# Patient Record
Sex: FEMALE | Race: BLACK OR AFRICAN AMERICAN | NOT HISPANIC OR LATINO
[De-identification: names, ages, dates, MRNs, and addresses within clinical notes are randomized per-mention and may not be internally consistent; named-entity substitution may affect disease eponyms.]

---

## 2017-01-03 ENCOUNTER — APPOINTMENT (OUTPATIENT)
Dept: OBGYN | Facility: CLINIC | Age: 57
End: 2017-01-03

## 2017-01-03 VITALS
HEIGHT: 67 IN | WEIGHT: 192 LBS | DIASTOLIC BLOOD PRESSURE: 80 MMHG | BODY MASS INDEX: 30.13 KG/M2 | SYSTOLIC BLOOD PRESSURE: 110 MMHG

## 2017-01-03 LAB
BILIRUB UR QL STRIP: NORMAL
GLUCOSE UR-MCNC: NORMAL
HCG UR QL: 1 EU/DL
HGB UR QL STRIP.AUTO: NORMAL
KETONES UR-MCNC: NORMAL
LEUKOCYTE ESTERASE UR QL STRIP: NORMAL
NITRITE UR QL STRIP: NORMAL
PH UR STRIP: 6
PROT UR STRIP-MCNC: NORMAL
SP GR UR STRIP: 1.02

## 2017-01-05 LAB — BACTERIA UR CULT: NORMAL

## 2017-03-17 ENCOUNTER — NON-APPOINTMENT (OUTPATIENT)
Age: 57
End: 2017-03-17

## 2017-03-17 ENCOUNTER — APPOINTMENT (OUTPATIENT)
Dept: INTERNAL MEDICINE | Facility: CLINIC | Age: 57
End: 2017-03-17

## 2017-03-17 VITALS
OXYGEN SATURATION: 98 % | DIASTOLIC BLOOD PRESSURE: 77 MMHG | HEART RATE: 80 BPM | RESPIRATION RATE: 18 BRPM | BODY MASS INDEX: 32.96 KG/M2 | HEIGHT: 67 IN | TEMPERATURE: 98.9 F | WEIGHT: 210 LBS | SYSTOLIC BLOOD PRESSURE: 114 MMHG

## 2017-03-17 RX ORDER — PHENAZOPYRIDINE 200 MG/1
200 TABLET, FILM COATED ORAL 3 TIMES DAILY
Qty: 6 | Refills: 0 | Status: DISCONTINUED | COMMUNITY
Start: 2017-01-03 | End: 2017-03-17

## 2017-03-17 RX ORDER — CIPROFLOXACIN HYDROCHLORIDE 500 MG/1
500 TABLET, FILM COATED ORAL
Qty: 6 | Refills: 0 | Status: DISCONTINUED | COMMUNITY
Start: 2017-01-03 | End: 2017-03-17

## 2017-03-28 LAB
25(OH)D3 SERPL-MCNC: 23.1 NG/ML
ALBUMIN SERPL ELPH-MCNC: 3.9 G/DL
ALP BLD-CCNC: 70 U/L
ALT SERPL-CCNC: 22 U/L
ANION GAP SERPL CALC-SCNC: 12 MMOL/L
AST SERPL-CCNC: 17 U/L
BASOPHILS # BLD AUTO: 0.02 K/UL
BASOPHILS NFR BLD AUTO: 0.4 %
BILIRUB SERPL-MCNC: 0.9 MG/DL
BUN SERPL-MCNC: 12 MG/DL
CALCIUM SERPL-MCNC: 9.7 MG/DL
CHLORIDE SERPL-SCNC: 106 MMOL/L
CHOLEST SERPL-MCNC: 156 MG/DL
CHOLEST/HDLC SERPL: 2.8 RATIO
CO2 SERPL-SCNC: 24 MMOL/L
CREAT SERPL-MCNC: 0.78 MG/DL
EOSINOPHIL # BLD AUTO: 0.17 K/UL
EOSINOPHIL NFR BLD AUTO: 3 %
GLUCOSE SERPL-MCNC: 88 MG/DL
HCT VFR BLD CALC: 41.2 %
HDLC SERPL-MCNC: 56 MG/DL
HGB BLD-MCNC: 13.9 G/DL
HIV1+2 AB SPEC QL IA.RAPID: NONREACTIVE
IMM GRANULOCYTES NFR BLD AUTO: 0.2 %
LDLC SERPL CALC-MCNC: 89 MG/DL
LYMPHOCYTES # BLD AUTO: 2.64 K/UL
LYMPHOCYTES NFR BLD AUTO: 47.2 %
MAN DIFF?: NORMAL
MCHC RBC-ENTMCNC: 30.2 PG
MCHC RBC-ENTMCNC: 33.7 GM/DL
MCV RBC AUTO: 89.6 FL
MONOCYTES # BLD AUTO: 0.65 K/UL
MONOCYTES NFR BLD AUTO: 11.6 %
NEUTROPHILS # BLD AUTO: 2.1 K/UL
NEUTROPHILS NFR BLD AUTO: 37.6 %
PLATELET # BLD AUTO: 261 K/UL
POTASSIUM SERPL-SCNC: 4.2 MMOL/L
PROT SERPL-MCNC: 6.5 G/DL
RBC # BLD: 4.6 M/UL
RBC # FLD: 13.4 %
SODIUM SERPL-SCNC: 142 MMOL/L
T4 FREE SERPL-MCNC: 1.2 NG/DL
TRIGL SERPL-MCNC: 54 MG/DL
TSH SERPL-ACNC: 1.12 UIU/ML
WBC # FLD AUTO: 5.59 K/UL

## 2017-05-19 ENCOUNTER — APPOINTMENT (OUTPATIENT)
Dept: OBGYN | Facility: CLINIC | Age: 57
End: 2017-05-19

## 2017-05-19 VITALS
HEIGHT: 67 IN | BODY MASS INDEX: 32.96 KG/M2 | DIASTOLIC BLOOD PRESSURE: 70 MMHG | SYSTOLIC BLOOD PRESSURE: 126 MMHG | WEIGHT: 210 LBS

## 2017-05-19 DIAGNOSIS — R39.9 UNSPECIFIED SYMPTOMS AND SIGNS INVOLVING THE GENITOURINARY SYSTEM: ICD-10-CM

## 2017-05-22 ENCOUNTER — NON-APPOINTMENT (OUTPATIENT)
Age: 57
End: 2017-05-22

## 2017-05-22 ENCOUNTER — APPOINTMENT (OUTPATIENT)
Dept: GASTROENTEROLOGY | Facility: CLINIC | Age: 57
End: 2017-05-22

## 2017-05-22 ENCOUNTER — APPOINTMENT (OUTPATIENT)
Dept: CARDIOLOGY | Facility: CLINIC | Age: 57
End: 2017-05-22

## 2017-05-22 VITALS
BODY MASS INDEX: 32.96 KG/M2 | HEIGHT: 67 IN | HEART RATE: 69 BPM | DIASTOLIC BLOOD PRESSURE: 85 MMHG | OXYGEN SATURATION: 97 % | WEIGHT: 210 LBS | SYSTOLIC BLOOD PRESSURE: 133 MMHG

## 2017-05-22 VITALS
BODY MASS INDEX: 32.96 KG/M2 | OXYGEN SATURATION: 98 % | DIASTOLIC BLOOD PRESSURE: 76 MMHG | WEIGHT: 210 LBS | HEIGHT: 67 IN | HEART RATE: 76 BPM | SYSTOLIC BLOOD PRESSURE: 150 MMHG

## 2017-05-22 PROBLEM — R39.9 UTI SYMPTOMS: Status: RESOLVED | Noted: 2017-01-03 | Resolved: 2017-05-22

## 2017-05-22 LAB — HPV HIGH+LOW RISK DNA PNL CVX: NEGATIVE

## 2017-05-23 ENCOUNTER — APPOINTMENT (OUTPATIENT)
Dept: CARDIOLOGY | Facility: CLINIC | Age: 57
End: 2017-05-23

## 2017-05-24 ENCOUNTER — APPOINTMENT (OUTPATIENT)
Dept: CARDIOLOGY | Facility: CLINIC | Age: 57
End: 2017-05-24

## 2017-05-24 LAB — CYTOLOGY CVX/VAG DOC THIN PREP: NORMAL

## 2017-05-26 ENCOUNTER — APPOINTMENT (OUTPATIENT)
Dept: GASTROENTEROLOGY | Facility: CLINIC | Age: 57
End: 2017-05-26

## 2017-05-26 ENCOUNTER — TRANSCRIPTION ENCOUNTER (OUTPATIENT)
Age: 57
End: 2017-05-26

## 2017-05-26 ENCOUNTER — RESULT REVIEW (OUTPATIENT)
Age: 57
End: 2017-05-26

## 2017-05-26 ENCOUNTER — OUTPATIENT (OUTPATIENT)
Dept: OUTPATIENT SERVICES | Facility: HOSPITAL | Age: 57
LOS: 1 days | Discharge: HOME | End: 2017-05-26
Payer: COMMERCIAL

## 2017-05-26 DIAGNOSIS — R07.89 OTHER CHEST PAIN: ICD-10-CM

## 2017-05-26 DIAGNOSIS — Z12.11 ENCOUNTER FOR SCREENING FOR MALIGNANT NEOPLASM OF COLON: ICD-10-CM

## 2017-05-26 DIAGNOSIS — K59.00 CONSTIPATION, UNSPECIFIED: ICD-10-CM

## 2017-05-26 PROCEDURE — 88313 SPECIAL STAINS GROUP 2: CPT

## 2017-05-26 PROCEDURE — 43239 EGD BIOPSY SINGLE/MULTIPLE: CPT

## 2017-05-26 PROCEDURE — 88313 SPECIAL STAINS GROUP 2: CPT | Mod: 26

## 2017-05-26 PROCEDURE — 45380 COLONOSCOPY AND BIOPSY: CPT | Mod: 59

## 2017-05-26 PROCEDURE — 88312 SPECIAL STAINS GROUP 1: CPT | Mod: 26

## 2017-05-26 PROCEDURE — 88312 SPECIAL STAINS GROUP 1: CPT

## 2017-05-26 PROCEDURE — 45385 COLONOSCOPY W/LESION REMOVAL: CPT

## 2017-05-26 PROCEDURE — 88305 TISSUE EXAM BY PATHOLOGIST: CPT

## 2017-05-26 PROCEDURE — 45380 COLONOSCOPY AND BIOPSY: CPT | Mod: XS

## 2017-05-26 PROCEDURE — 88305 TISSUE EXAM BY PATHOLOGIST: CPT | Mod: 26

## 2017-05-30 DIAGNOSIS — K44.9 DIAPHRAGMATIC HERNIA WITHOUT OBSTRUCTION OR GANGRENE: ICD-10-CM

## 2017-05-30 DIAGNOSIS — D12.0 BENIGN NEOPLASM OF CECUM: ICD-10-CM

## 2017-05-30 DIAGNOSIS — K59.00 CONSTIPATION, UNSPECIFIED: ICD-10-CM

## 2017-05-30 DIAGNOSIS — D12.5 BENIGN NEOPLASM OF SIGMOID COLON: ICD-10-CM

## 2017-05-30 DIAGNOSIS — Z88.6 ALLERGY STATUS TO ANALGESIC AGENT: ICD-10-CM

## 2017-05-30 DIAGNOSIS — E55.9 VITAMIN D DEFICIENCY, UNSPECIFIED: ICD-10-CM

## 2017-05-30 DIAGNOSIS — K21.9 GASTRO-ESOPHAGEAL REFLUX DISEASE WITHOUT ESOPHAGITIS: ICD-10-CM

## 2017-05-30 DIAGNOSIS — Z88.0 ALLERGY STATUS TO PENICILLIN: ICD-10-CM

## 2017-05-30 DIAGNOSIS — K64.8 OTHER HEMORRHOIDS: ICD-10-CM

## 2017-06-01 DIAGNOSIS — Z88.6 ALLERGY STATUS TO ANALGESIC AGENT: ICD-10-CM

## 2017-06-09 ENCOUNTER — APPOINTMENT (OUTPATIENT)
Dept: GASTROENTEROLOGY | Facility: CLINIC | Age: 57
End: 2017-06-09

## 2017-06-15 ENCOUNTER — MESSAGE (OUTPATIENT)
Age: 57
End: 2017-06-15

## 2017-06-19 ENCOUNTER — APPOINTMENT (OUTPATIENT)
Dept: RADIOLOGY | Facility: CLINIC | Age: 57
End: 2017-06-19

## 2017-08-07 ENCOUNTER — APPOINTMENT (OUTPATIENT)
Dept: GASTROENTEROLOGY | Facility: CLINIC | Age: 57
End: 2017-08-07

## 2019-02-04 ENCOUNTER — APPOINTMENT (OUTPATIENT)
Dept: SPINE | Facility: CLINIC | Age: 59
End: 2019-02-04
Payer: COMMERCIAL

## 2019-02-04 VITALS
WEIGHT: 204 LBS | DIASTOLIC BLOOD PRESSURE: 80 MMHG | BODY MASS INDEX: 31.64 KG/M2 | SYSTOLIC BLOOD PRESSURE: 130 MMHG | HEIGHT: 67.5 IN

## 2019-02-04 PROCEDURE — 99204 OFFICE O/P NEW MOD 45 MIN: CPT

## 2019-02-04 RX ORDER — IBUPROFEN 600 MG/1
600 TABLET, FILM COATED ORAL
Refills: 0 | Status: DISCONTINUED | COMMUNITY
End: 2019-02-04

## 2019-02-04 RX ORDER — OMEPRAZOLE 40 MG/1
40 CAPSULE, DELAYED RELEASE ORAL
Qty: 30 | Refills: 3 | Status: DISCONTINUED | COMMUNITY
Start: 2017-06-10 | End: 2019-02-04

## 2019-02-04 RX ORDER — SODIUM SULFATE, POTASSIUM SULFATE, MAGNESIUM SULFATE 17.5; 3.13; 1.6 G/ML; G/ML; G/ML
17.5-3.13-1.6 SOLUTION, CONCENTRATE ORAL
Qty: 1 | Refills: 0 | Status: DISCONTINUED | COMMUNITY
Start: 2017-05-23 | End: 2019-02-04

## 2019-02-04 RX ORDER — OMEPRAZOLE 40 MG/1
40 CAPSULE, DELAYED RELEASE ORAL
Qty: 30 | Refills: 2 | Status: DISCONTINUED | COMMUNITY
Start: 2017-06-09 | End: 2019-02-04

## 2019-02-04 NOTE — REVIEW OF SYSTEMS
[Negative] : Heme/Lymph [Abnormal Sensation] : an abnormal sensation [As Noted in HPI] : as noted in HPI [Arthralgias] : arthralgias [de-identified] : back pain into right sided of abdomen T7

## 2019-02-04 NOTE — PLAN
[FreeTextEntry1] : As the patient has radiographic evidence of spinal cord displacement, I believe that the patient may benefit from T4-T7 laminectomies for biopsy and resection of her dorsal spinal lesion. The risks, benefits, and alternatives to surgery were discussed with the patient, who expressed understanding, but remains undecided regarding surgery. The patient will contact my office if and when she decides to proceed with surgery.

## 2019-02-04 NOTE — DATA REVIEWED
[de-identified] : CT scan from [facility].  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

## 2019-02-04 NOTE — PHYSICAL EXAM
[General Appearance - Alert] : alert [General Appearance - In No Acute Distress] : in no acute distress [Oriented To Time, Place, And Person] : oriented to person, place, and time [Impaired Insight] : insight and judgment were intact [Affect] : the affect was normal [Person] : oriented to person [Place] : oriented to place [Time] : oriented to time [Short Term Intact] : short term memory intact [Remote Intact] : remote memory intact [Span Intact] : the attention span was normal [Concentration Intact] : normal concentrating ability [Fluency] : fluency intact [Comprehension] : comprehension intact [Current Events] : adequate knowledge of current events [Past History] : adequate knowledge of personal past history [Vocabulary] : adequate range of vocabulary [Cranial Nerves Optic (II)] : visual acuity intact bilaterally,  pupils equal round and reactive to light [Cranial Nerves Oculomotor (III)] : extraocular motion intact [Cranial Nerves Trigeminal (V)] : facial sensation intact symmetrically [Cranial Nerves Facial (VII)] : face symmetrical [Cranial Nerves Vestibulocochlear (VIII)] : hearing was intact bilaterally [Cranial Nerves Glossopharyngeal (IX)] : tongue and palate midline [Cranial Nerves Accessory (XI - Cranial And Spinal)] : head turning and shoulder shrug symmetric [Cranial Nerves Hypoglossal (XII)] : there was no tongue deviation with protrusion [Motor Strength] : muscle strength was normal in all four extremities [No Muscle Atrophy] : normal bulk in all four extremities [Sensation Tactile Decrease] : light touch was intact [Balance] : balance was intact [Past-pointing] : there was no past-pointing [Tremor] : no tremor present [2+] : Patella left 2+ [L'Hermitte's] : neck flexion did not produce tingling down the spine/arms [Spurling's - Opposite Side] : Negative Spurling's on opposite side [Spurling's Same Side] : Negative Spurling's on same side [No Visual Abnormalities] : no visible abnormailities [No Tenderness to Palpation] : no spine tenderness on palpation [Full ROM] : full ROM [No Pain with ROM] : no pain with motion in any direction [Straight-Leg Raise Test - Left] : straight leg raise of the left leg was negative [Straight-Leg Raise Test - Right] : straight leg raise  of the right leg was negative [Normal] : normal [Intact] : all reflexes within normal limits bilaterally [Sclera] : the sclera and conjunctiva were normal [PERRL With Normal Accommodation] : pupils were equal in size, round, reactive to light, with normal accommodation [Extraocular Movements] : extraocular movements were intact [Outer Ear] : the ears and nose were normal in appearance [Oropharynx] : the oropharynx was normal [Neck Appearance] : the appearance of the neck was normal [Neck Cervical Mass (___cm)] : no neck mass was observed [Jugular Venous Distention Increased] : there was no jugular-venous distention [Thyroid Diffuse Enlargement] : the thyroid was not enlarged [Thyroid Nodule] : there were no palpable thyroid nodules [Auscultation Breath Sounds / Voice Sounds] : lungs were clear to auscultation bilaterally [Heart Rate And Rhythm] : heart rate was normal and rhythm regular [Heart Sounds] : normal S1 and S2 [Heart Sounds Gallop] : no gallops [Murmurs] : no murmurs [Heart Sounds Pericardial Friction Rub] : no pericardial rub [Full Pulse] : the pedal pulses are present [Edema] : there was no peripheral edema [Bowel Sounds] : normal bowel sounds [Abdomen Soft] : soft [Abdomen Tenderness] : non-tender [Abdomen Mass (___ Cm)] : no abdominal mass palpated [No CVA Tenderness] : no ~M costovertebral angle tenderness [No Spinal Tenderness] : no spinal tenderness [Abnormal Walk] : normal gait [Nail Clubbing] : no clubbing  or cyanosis of the fingernails [Musculoskeletal - Swelling] : no joint swelling seen [Motor Tone] : muscle strength and tone were normal [Skin Color & Pigmentation] : normal skin color and pigmentation [Skin Turgor] : normal skin turgor [] : no rash

## 2019-02-04 NOTE — HISTORY OF PRESENT ILLNESS
[> 3 months] : more  than 3 months [de-identified] : I had the pleasure of seeing Ms. Nasrin Esparza for an initial visit to my office today. Ms. Esparza is a pleasant 58-year-old female who unfortunately has been suffering from upper back pain associated with a right T7 radiculopathy since last September. The patient denies any numbness or tingling, upper or lower extremity symptoms, difficulty walking, or bowel and bladder issues. The patient has tried physical therapy, with minimal to no relief.

## 2019-02-04 NOTE — REASON FOR VISIT
[New Patient Visit] : a new patient visit [Referred By: _________] : Patient was referred by VIANEY [Other: _____] : [unfilled] [FreeTextEntry1] : Arachnoid cyst - thoracic spine

## 2020-08-04 ENCOUNTER — APPOINTMENT (OUTPATIENT)
Dept: INTERNAL MEDICINE | Facility: CLINIC | Age: 60
End: 2020-08-04

## 2020-08-10 ENCOUNTER — APPOINTMENT (OUTPATIENT)
Dept: INTERNAL MEDICINE | Facility: CLINIC | Age: 60
End: 2020-08-10
Payer: MEDICAID

## 2020-08-10 ENCOUNTER — NON-APPOINTMENT (OUTPATIENT)
Age: 60
End: 2020-08-10

## 2020-08-10 VITALS
DIASTOLIC BLOOD PRESSURE: 80 MMHG | WEIGHT: 199 LBS | SYSTOLIC BLOOD PRESSURE: 130 MMHG | TEMPERATURE: 97.3 F | HEIGHT: 67.5 IN | HEART RATE: 89 BPM | BODY MASS INDEX: 30.87 KG/M2 | RESPIRATION RATE: 17 BRPM | OXYGEN SATURATION: 97 %

## 2020-08-10 DIAGNOSIS — Z87.19 PERSONAL HISTORY OF OTHER DISEASES OF THE DIGESTIVE SYSTEM: ICD-10-CM

## 2020-08-10 DIAGNOSIS — Z87.2 PERSONAL HISTORY OF DISEASES OF THE SKIN AND SUBCUTANEOUS TISSUE: ICD-10-CM

## 2020-08-10 DIAGNOSIS — Z13.820 ENCOUNTER FOR SCREENING FOR OSTEOPOROSIS: ICD-10-CM

## 2020-08-10 DIAGNOSIS — Z12.11 ENCOUNTER FOR SCREENING FOR MALIGNANT NEOPLASM OF COLON: ICD-10-CM

## 2020-08-10 DIAGNOSIS — K63.5 POLYP OF COLON: ICD-10-CM

## 2020-08-10 DIAGNOSIS — K44.9 DIAPHRAGMATIC HERNIA W/OUT OBSTRUCTION OR GANGRENE: ICD-10-CM

## 2020-08-10 DIAGNOSIS — Z87.898 PERSONAL HISTORY OF OTHER SPECIFIED CONDITIONS: ICD-10-CM

## 2020-08-10 PROCEDURE — G0444 DEPRESSION SCREEN ANNUAL: CPT

## 2020-08-10 PROCEDURE — 93000 ELECTROCARDIOGRAM COMPLETE: CPT

## 2020-08-10 PROCEDURE — G0447 BEHAVIOR COUNSEL OBESITY 15M: CPT

## 2020-08-10 PROCEDURE — 99386 PREV VISIT NEW AGE 40-64: CPT | Mod: 25

## 2020-08-13 DIAGNOSIS — D72.820 LYMPHOCYTOSIS (SYMPTOMATIC): ICD-10-CM

## 2020-08-13 LAB
25(OH)D3 SERPL-MCNC: 32.6 NG/ML
ALBUMIN SERPL ELPH-MCNC: 4.2 G/DL
ALP BLD-CCNC: 84 U/L
ALT SERPL-CCNC: 17 U/L
ANION GAP SERPL CALC-SCNC: 10 MMOL/L
AST SERPL-CCNC: 12 U/L
BASOPHILS # BLD AUTO: 0.04 K/UL
BASOPHILS NFR BLD AUTO: 0.5 %
BILIRUB SERPL-MCNC: 0.6 MG/DL
BUN SERPL-MCNC: 16 MG/DL
CALCIUM SERPL-MCNC: 9.7 MG/DL
CHLORIDE SERPL-SCNC: 107 MMOL/L
CHOLEST SERPL-MCNC: 180 MG/DL
CHOLEST/HDLC SERPL: 3.3 RATIO
CO2 SERPL-SCNC: 26 MMOL/L
CREAT SERPL-MCNC: 0.89 MG/DL
EOSINOPHIL # BLD AUTO: 0.14 K/UL
EOSINOPHIL NFR BLD AUTO: 1.9 %
ESTIMATED AVERAGE GLUCOSE: 108 MG/DL
GLUCOSE SERPL-MCNC: 92 MG/DL
HBA1C MFR BLD HPLC: 5.4 %
HCT VFR BLD CALC: 43.5 %
HDLC SERPL-MCNC: 54 MG/DL
HGB BLD-MCNC: 13.7 G/DL
IMM GRANULOCYTES NFR BLD AUTO: 0.1 %
LDLC SERPL CALC-MCNC: 114 MG/DL
LYMPHOCYTES # BLD AUTO: 3.83 K/UL
LYMPHOCYTES NFR BLD AUTO: 51.1 %
MAN DIFF?: NORMAL
MCHC RBC-ENTMCNC: 29.5 PG
MCHC RBC-ENTMCNC: 31.5 GM/DL
MCV RBC AUTO: 93.5 FL
MONOCYTES # BLD AUTO: 0.84 K/UL
MONOCYTES NFR BLD AUTO: 11.2 %
NEUTROPHILS # BLD AUTO: 2.63 K/UL
NEUTROPHILS NFR BLD AUTO: 35.2 %
PLATELET # BLD AUTO: 297 K/UL
POTASSIUM SERPL-SCNC: 4.1 MMOL/L
PROT SERPL-MCNC: 7 G/DL
RBC # BLD: 4.65 M/UL
RBC # FLD: 13.6 %
SODIUM SERPL-SCNC: 143 MMOL/L
TRIGL SERPL-MCNC: 63 MG/DL
TSH SERPL-ACNC: 1.23 UIU/ML
WBC # FLD AUTO: 7.49 K/UL

## 2020-08-18 ENCOUNTER — APPOINTMENT (OUTPATIENT)
Dept: NEUROSURGERY | Facility: CLINIC | Age: 60
End: 2020-08-18
Payer: MEDICAID

## 2020-08-18 VITALS
SYSTOLIC BLOOD PRESSURE: 147 MMHG | RESPIRATION RATE: 16 BRPM | WEIGHT: 199 LBS | HEART RATE: 71 BPM | DIASTOLIC BLOOD PRESSURE: 71 MMHG | TEMPERATURE: 98.2 F | HEIGHT: 67 IN | OXYGEN SATURATION: 98 % | BODY MASS INDEX: 31.23 KG/M2

## 2020-08-18 DIAGNOSIS — Z63.5 DISRUPTION OF FAMILY BY SEPARATION AND DIVORCE: ICD-10-CM

## 2020-08-18 PROCEDURE — 99215 OFFICE O/P EST HI 40 MIN: CPT

## 2020-08-18 SDOH — SOCIAL STABILITY - SOCIAL INSECURITY: DISRUPTION OF FAMILY BY SEPARATION AND DIVORCE: Z63.5

## 2020-08-18 NOTE — PHYSICAL EXAM
[General Appearance - Alert] : alert [General Appearance - In No Acute Distress] : in no acute distress [General Appearance - Well Nourished] : well nourished [General Appearance - Well-Appearing] : healthy appearing [] : normal voice and communication [Oriented To Time, Place, And Person] : oriented to person, place, and time [Impaired Insight] : insight and judgment were intact [Affect] : the affect was normal [Mood] : the mood was normal [Memory Recent] : recent memory was not impaired [Person] : oriented to person [Place] : oriented to place [Time] : oriented to time [Cranial Nerves Optic (II)] : visual acuity intact bilaterally,  pupils equal round and reactive to light [Cranial Nerves Oculomotor (III)] : extraocular motion intact [Cranial Nerves Trigeminal (V)] : facial sensation intact symmetrically [Cranial Nerves Facial (VII)] : face symmetrical [Cranial Nerves Vestibulocochlear (VIII)] : hearing was intact bilaterally [Cranial Nerves Accessory (XI - Cranial And Spinal)] : head turning and shoulder shrug symmetric [Cranial Nerves Glossopharyngeal (IX)] : tongue and palate midline [Cranial Nerves Hypoglossal (XII)] : there was no tongue deviation with protrusion [Motor Tone] : muscle tone was normal in all four extremities [Motor Strength] : muscle strength was normal in all four extremities [Involuntary Movements] : no involuntary movements were seen [Motor Handedness Right-Handed] : the patient is right hand dominant [5] : C8 finger flexors 5/5 [Sensation Tactile Decrease] : light touch was intact [Abnormal Walk] : normal gait [Balance] : balance was intact [1+] : Brachioradialis left 1+ [2+] : Ankle jerk left 2+ [Sclera] : the sclera and conjunctiva were normal [PERRL With Normal Accommodation] : pupils were equal in size, round, reactive to light, with normal accommodation [Extraocular Movements] : extraocular movements were intact [Sensation Pain / Temperature Decrease] : pain and temperature was intact [Limited Balance] : balance was intact [Straight-Leg Raise Test - Left] : straight leg raise of the left leg was negative [Able to toe walk] : the patient was able to toe walk [Straight-Leg Raise Test - Right] : straight leg raise  of the right leg was negative [Able to heel walk] : the patient was able to heel walk

## 2020-08-18 NOTE — ASSESSMENT
[FreeTextEntry1] : IMPRESSION:\par Neck pain with radiation into the right upper extremity\par Thoracic pain with radiation around the rib cage - no improvement with drainage of upper thoracic arachnoid cyst at [place]\par Low back pain with radiation into the right lower extremity\par \par \par PLAN:\par 1. MRI w/wo contrast of cervical and thoracic spine.\par 2. MRI lumbar spine w/o.\par 3. Pt to follow up after imaging.\par \par Christian Lock MD, FACS, FAANS\par Professor and \par Department of Neurosurgery\par [facility] [employer] [facility]\par [address], [address]\[place] [address]\par [phone] Clinical\par [phone] Academic\par \par

## 2020-08-18 NOTE — HISTORY OF PRESENT ILLNESS
[de-identified] : Nasrin Esparza is a pleasant right handed 59 year old lady who presents for consult regarding multiple spinal pains.  She has a history of chronic upper back pain that was thought to be secondary to an arachnoid cyst of the upper thoracic Spine. She was seen by Dr. Yusuf on 2/4/2019, although she has no recollection of seeing him.  She had thoracic spine surgery on 3/28/2019 at [facility] by Dr. Jake Rossi, .  However, she says that her pain did not get better. She states that she has been unable to get a follow up visit with Dr. Rossi.  \par \par She presents today with c/o posterior neck pain that radiates down the right shoulder to all fingertips.  She also reports pain at the thoracic spine incision which increases when she applies pressure to the area.  She also reports right upper back pain that radiates to below right breast.  She also reports lower back cramping pain that radiates to posterior right thigh to right calf area. Pain started after bending then standing from putting a pack of H2O under a cart while working at [employer] in 2015. Pain is 8-9/10 relieved with Advil gel prn.  States that she followed up with Dr. Thania Eagle PCP at that time.  She was told by her neurologist at the time that she had  lumbar spinal stenosis by Dr. Alonzo Blancas (Othopedic Surgeon) ,[address] [place] [address], [address] [phone].   She has not had any lumbar surgery and was prescribed PT.   She also reports occasional sensation of weakness in right leg after walking long distances.  States that after walking or sitting for half an hour the pain in lower back increases.   Pain is relieved with increased activity.  She denies numbness and tingling in the lower extremities.\par \par She goes to PT twice per week and was prescribed Tramadol, Gabapentin and Meloxicam without relief. However the medications made her drowsy.\par \par Her PCP is Dr. Connie Perry, [facility].

## 2020-08-18 NOTE — DATA REVIEWED
[de-identified] : MRI THORACIC SPINE NON CONTRAST 12/11/18, 2/21/20 AND MRI THORACIC SPINE W/WO 5/30/19 DONE AT  . Preop MRI shows a subtle arachnoid cysts dorsally in the upper thoracic region with some mass effect on the spinal cord. Post op MRIs show that the cyst is no longer there.

## 2020-08-18 NOTE — CONSULT LETTER
[Please see my note below.] : Please see my note below. [Dear  ___] : Dear ~MIGUELINA, [Consult Letter:] : I had the pleasure of evaluating your patient, [unfilled]. [Sincerely,] : Sincerely, [FreeTextEntry2] : Connie Perry, \par [address] [address] [Consult Closing:] : Thank you very much for allowing me to participate in the care of this patient.  If you have any questions, please do not hesitate to contact me. [FreeTextEntry3] : Christian Lock MD, FACS, FAANS\par Professor and \par Department of Neurosurgery\par [facility]\par [address] [place] [address], [address] [place] [address]\[place] [address]\par [phone] Clinical\par [phone] Academic\par

## 2020-08-18 NOTE — SOCIAL HISTORY
[No] : No [FreeTextEntry1] : 3/16/20 became unemployed due to Covid 19 pandemic.  over 15 years ago. Had 5 children and some grandchildren.

## 2020-08-18 NOTE — REASON FOR VISIT
[New Patient Visit] : a new patient visit [Referred By: _________] : Patient was referred by VIANEY [Other: _____] : [unfilled]

## 2020-08-23 DIAGNOSIS — R79.82 ELEVATED C-REACTIVE PROTEIN (CRP): ICD-10-CM

## 2020-08-23 LAB
BASOPHILS # BLD AUTO: 0.04 K/UL
BASOPHILS NFR BLD AUTO: 0.5 %
CRP SERPL-MCNC: 0.76 MG/DL
EOSINOPHIL # BLD AUTO: 0.12 K/UL
EOSINOPHIL NFR BLD AUTO: 1.6 %
ERYTHROCYTE [SEDIMENTATION RATE] IN BLOOD BY WESTERGREN METHOD: 52 MM/HR
HCT VFR BLD CALC: 41 %
HGB BLD-MCNC: 13.8 G/DL
IMM GRANULOCYTES NFR BLD AUTO: 0.1 %
LYMPHOCYTES # BLD AUTO: 2.84 K/UL
LYMPHOCYTES NFR BLD AUTO: 38.6 %
MAN DIFF?: NORMAL
MCHC RBC-ENTMCNC: 30.7 PG
MCHC RBC-ENTMCNC: 33.7 GM/DL
MCV RBC AUTO: 91.1 FL
MONOCYTES # BLD AUTO: 0.79 K/UL
MONOCYTES NFR BLD AUTO: 10.7 %
NEUTROPHILS # BLD AUTO: 3.56 K/UL
NEUTROPHILS NFR BLD AUTO: 48.5 %
PLATELET # BLD AUTO: 321 K/UL
RBC # BLD: 4.5 M/UL
RBC # FLD: 13.4 %
WBC # FLD AUTO: 7.36 K/UL

## 2020-08-28 ENCOUNTER — RESULT REVIEW (OUTPATIENT)
Age: 60
End: 2020-08-28

## 2020-08-28 ENCOUNTER — APPOINTMENT (OUTPATIENT)
Dept: MAMMOGRAPHY | Facility: CLINIC | Age: 60
End: 2020-08-28
Payer: MEDICAID

## 2020-08-28 ENCOUNTER — OUTPATIENT (OUTPATIENT)
Dept: OUTPATIENT SERVICES | Facility: HOSPITAL | Age: 60
LOS: 1 days | End: 2020-08-28
Payer: MEDICAID

## 2020-08-28 ENCOUNTER — APPOINTMENT (OUTPATIENT)
Dept: RADIOLOGY | Facility: CLINIC | Age: 60
End: 2020-08-28
Payer: MEDICAID

## 2020-08-28 DIAGNOSIS — M85.80 OTHER SPECIFIED DISORDERS OF BONE DENSITY AND STRUCTURE, UNSPECIFIED SITE: ICD-10-CM

## 2020-08-28 PROCEDURE — 77080 DXA BONE DENSITY AXIAL: CPT

## 2020-08-28 PROCEDURE — 77067 SCR MAMMO BI INCL CAD: CPT | Mod: 26

## 2020-08-28 PROCEDURE — 77063 BREAST TOMOSYNTHESIS BI: CPT | Mod: 26

## 2020-08-28 PROCEDURE — 77063 BREAST TOMOSYNTHESIS BI: CPT

## 2020-08-28 PROCEDURE — 77080 DXA BONE DENSITY AXIAL: CPT | Mod: 26

## 2020-08-28 PROCEDURE — 77067 SCR MAMMO BI INCL CAD: CPT

## 2020-09-08 ENCOUNTER — APPOINTMENT (OUTPATIENT)
Dept: MRI IMAGING | Facility: CLINIC | Age: 60
End: 2020-09-08
Payer: SELF-PAY

## 2020-09-08 ENCOUNTER — OUTPATIENT (OUTPATIENT)
Dept: OUTPATIENT SERVICES | Facility: HOSPITAL | Age: 60
LOS: 1 days | End: 2020-09-08
Payer: MEDICAID

## 2020-09-08 ENCOUNTER — APPOINTMENT (OUTPATIENT)
Dept: RHEUMATOLOGY | Facility: CLINIC | Age: 60
End: 2020-09-08
Payer: SELF-PAY

## 2020-09-08 VITALS
DIASTOLIC BLOOD PRESSURE: 84 MMHG | TEMPERATURE: 97.3 F | WEIGHT: 195 LBS | HEART RATE: 78 BPM | BODY MASS INDEX: 30.61 KG/M2 | OXYGEN SATURATION: 98 % | HEIGHT: 67 IN | SYSTOLIC BLOOD PRESSURE: 131 MMHG

## 2020-09-08 DIAGNOSIS — M25.50 PAIN IN UNSPECIFIED JOINT: ICD-10-CM

## 2020-09-08 DIAGNOSIS — M54.2 CERVICALGIA: ICD-10-CM

## 2020-09-08 DIAGNOSIS — R70.0 ELEVATED ERYTHROCYTE SEDIMENTATION RATE: ICD-10-CM

## 2020-09-08 PROCEDURE — 72156 MRI NECK SPINE W/O & W/DYE: CPT

## 2020-09-08 PROCEDURE — 72157 MRI CHEST SPINE W/O & W/DYE: CPT | Mod: 26

## 2020-09-08 PROCEDURE — 72148 MRI LUMBAR SPINE W/O DYE: CPT | Mod: 26

## 2020-09-08 PROCEDURE — 72156 MRI NECK SPINE W/O & W/DYE: CPT | Mod: 26

## 2020-09-08 PROCEDURE — 99204 OFFICE O/P NEW MOD 45 MIN: CPT

## 2020-09-08 PROCEDURE — 72148 MRI LUMBAR SPINE W/O DYE: CPT

## 2020-09-08 PROCEDURE — 72157 MRI CHEST SPINE W/O & W/DYE: CPT

## 2020-09-08 PROCEDURE — A9585: CPT

## 2020-09-08 RX ORDER — MELOXICAM 15 MG/1
15 TABLET ORAL
Refills: 0 | Status: DISCONTINUED | COMMUNITY
End: 2020-09-08

## 2020-09-08 RX ORDER — TRAMADOL HYDROCHLORIDE 25 MG/1
TABLET, COATED ORAL
Refills: 0 | Status: DISCONTINUED | COMMUNITY
End: 2020-09-08

## 2020-09-09 ENCOUNTER — APPOINTMENT (OUTPATIENT)
Dept: GASTROENTEROLOGY | Facility: CLINIC | Age: 60
End: 2020-09-09
Payer: MEDICAID

## 2020-09-09 VITALS
SYSTOLIC BLOOD PRESSURE: 115 MMHG | HEIGHT: 67 IN | OXYGEN SATURATION: 99 % | WEIGHT: 197 LBS | HEART RATE: 70 BPM | BODY MASS INDEX: 30.92 KG/M2 | DIASTOLIC BLOOD PRESSURE: 80 MMHG

## 2020-09-09 DIAGNOSIS — Z12.11 ENCOUNTER FOR SCREENING FOR MALIGNANT NEOPLASM OF COLON: ICD-10-CM

## 2020-09-09 LAB
CCP AB SER IA-ACNC: 12 UNITS
DSDNA AB SER-ACNC: <12 IU/ML
ENA RNP AB SER IA-ACNC: <0.2 AL
ENA SM AB SER IA-ACNC: <0.2 AL
ENA SS-A AB SER IA-ACNC: 0.4 AL
ENA SS-B AB SER IA-ACNC: <0.2 AL
RF+CCP IGG SER-IMP: NEGATIVE
RHEUMATOID FACT SER QL: <10 IU/ML

## 2020-09-09 PROCEDURE — 99202 OFFICE O/P NEW SF 15 MIN: CPT

## 2020-09-09 NOTE — HISTORY OF PRESENT ILLNESS
[de-identified] : Dr. Perry takes care of this very pleasant 59-year-old female\par \par She has had back and neck surgery for subarachnoid bleed\par \par She has good range of motion\par \par But fairly chronic pain still in that area\par \par She has history of colon polyps and has had 2 colonoscopies in the past\par \par She is having chronic constipation that seems to be getting worse\par \par She is tried many over-the-counter medications, she has not really tried MiraLAX or Dulcolax\par \par There is no recent blood per rectum\par \par No family history of colon or rectal cancer\par \par She has fairly chronic right sided abdominal discomfort into the flank and right anterior upper abdomen\par \par Its more related to movement\par \par No relationship to meals\par \par She has an ultrasound scheduled by the rheumatologist\par \par She has some mild heartburn and takes over-the-counter medications as needed\par \par No nausea or vomiting\par \par No dysphasia or pain with swallowing\par \par Rheumatologist prescribed prednisone 20 mg a day just recently\par \par

## 2020-09-09 NOTE — REVIEW OF SYSTEMS
[Feeling Poorly] : feeling poorly [Feeling Tired] : feeling tired [As Noted in HPI] : as noted in HPI [Negative] : Endocrine

## 2020-09-09 NOTE — PHYSICAL EXAM
[General Appearance - Alert] : alert [General Appearance - Well Nourished] : well nourished [General Appearance - In No Acute Distress] : in no acute distress [General Appearance - Well Developed] : well developed [General Appearance - Well-Appearing] : healthy appearing [Sclera] : the sclera and conjunctiva were normal [Neck Appearance] : the appearance of the neck was normal [Neck Cervical Mass (___cm)] : no neck mass was observed [Jugular Venous Distention Increased] : there was no jugular-venous distention [Auscultation Breath Sounds / Voice Sounds] : lungs were clear to auscultation bilaterally [Apical Impulse] : the apical impulse was normal [Heart Sounds] : normal S1 and S2 [Bowel Sounds] : normal bowel sounds [Edema] : there was no peripheral edema [Full Pulse] : the pedal pulses are present [Abdomen Tenderness] : non-tender [Abdomen Soft] : soft [Abdomen Mass (___ Cm)] : no abdominal mass palpated [Cervical Lymph Nodes Enlarged Posterior Bilaterally] : posterior cervical [Cervical Lymph Nodes Enlarged Anterior Bilaterally] : anterior cervical [Supraclavicular Lymph Nodes Enlarged Bilaterally] : supraclavicular [Axillary Lymph Nodes Enlarged Bilaterally] : axillary [Femoral Lymph Nodes Enlarged Bilaterally] : femoral [Inguinal Lymph Nodes Enlarged Bilaterally] : inguinal [Abnormal Walk] : normal gait [Nail Clubbing] : no clubbing  or cyanosis of the fingernails [Motor Tone] : muscle strength and tone were normal [Musculoskeletal - Swelling] : no joint swelling seen [Skin Turgor] : normal skin turgor [Skin Color & Pigmentation] : normal skin color and pigmentation [] : no rash [No Focal Deficits] : no focal deficits [Impaired Insight] : insight and judgment were intact [Oriented To Time, Place, And Person] : oriented to person, place, and time [Affect] : the affect was normal [FreeTextEntry1] : Full range of motion, scar in the midline of the very upper back

## 2020-09-09 NOTE — REASON FOR VISIT
[Initial Evaluation] : an initial evaluation [FreeTextEntry1] : History of colon polyps, request for colonoscopy, mild acid reflux symptoms chronic constipation, chronic right-sided abdominal discomfort with an ultrasound recently ordered

## 2020-09-09 NOTE — ASSESSMENT
[FreeTextEntry1] : Impression,\par \par Chronic constipation getting worse\par \par History of colon polyps\par \par Chronic abdominal right-sided pain, into the flank and back in a patient with significant rheumatologic issues\par \par Recently started on prednisone by rheumatologist\par \par Some mild heartburn, diet controlled or using over-the-counter medications\par \par Suggest,\par \par Agree with ultrasound of the abdomen\par \par Agree with upper endoscopy and colonoscopy\par \par Trial of MiraLAX on a daily basis\par \par Use Dulcolax at night as needed\par \par COVID testing\par \par Suprep\par \par The laxative, or its risks benefits and alternatives have been thoroughly reviewed with the patient in great detail. The laxative instructions have been reviewed in great detail with the patient.\par \par Risks/benefits:\par The procedure, the risks and benefits and alternatives have been reviewed in great detail with the patient.  Risks including, but not limited to sedation such as cardiac and pulmonary compromise, the procedure itself such as bleeding requiring hospitalization, transfusion, surgery, temporary or permanent colostomy.  Perforation or puncture of the requiring hospitalization, surgery, temporary colostomy.\par It has been explained to the patient that though colonoscopy is thought to be the best screening exam for colon cancer and polyps, no screening exam can find all colon polyps or cancers.  \par The patient expresses understanding of the procedure and consents to undergoing the procedure.\par \par Anesthesia clearance

## 2020-09-15 ENCOUNTER — APPOINTMENT (OUTPATIENT)
Dept: NEUROSURGERY | Facility: CLINIC | Age: 60
End: 2020-09-15
Payer: SELF-PAY

## 2020-09-15 VITALS
RESPIRATION RATE: 16 BRPM | WEIGHT: 197 LBS | TEMPERATURE: 98.2 F | DIASTOLIC BLOOD PRESSURE: 80 MMHG | SYSTOLIC BLOOD PRESSURE: 120 MMHG | OXYGEN SATURATION: 96 % | HEIGHT: 67 IN | BODY MASS INDEX: 30.92 KG/M2 | HEART RATE: 60 BPM

## 2020-09-15 PROCEDURE — 99215 OFFICE O/P EST HI 40 MIN: CPT

## 2020-09-15 NOTE — CONSULT LETTER
[Dear  ___] : Dear  [unfilled], [Consult Letter:] : I had the pleasure of evaluating your patient, [unfilled]. [Consult Closing:] : Thank you very much for allowing me to participate in the care of this patient.  If you have any questions, please do not hesitate to contact me. [Please see my note below.] : Please see my note below. [FreeTextEntry2] : Connie Perry MD \par Gisell\manoj  [Sincerely,] : Sincerely, [DrPeyton  ___] : Dr. WINTERS [FreeTextEntry3] : Christian Lock MD, FACS, FAANS\par Professor and \par Department of Neurosurgery\par [facility]\par [address] [place] [address], [address] [place] [address]\[place] [address]\par [phone] Clinical\par [phone] Academic\par

## 2020-09-15 NOTE — PHYSICAL EXAM
[General Appearance - Alert] : alert [General Appearance - In No Acute Distress] : in no acute distress [General Appearance - Well Nourished] : well nourished [General Appearance - Well-Appearing] : healthy appearing [Oriented To Time, Place, And Person] : oriented to person, place, and time [Affect] : the affect was normal [Person] : oriented to person [Memory Recent] : recent memory was not impaired [Place] : oriented to place [Time] : oriented to time [Motor Tone] : muscle tone was normal in all four extremities [Balance] : balance was intact [Outer Ear] : the ears and nose were normal in appearance [Sclera] : the sclera and conjunctiva were normal [Heart Rate And Rhythm] : heart rate was normal and rhythm regular [Abnormal Walk] : normal gait [Involuntary Movements] : no involuntary movements were seen [Sensation Tactile Decrease] : light touch was intact [Sensation Pain / Temperature Decrease] : pain and temperature was intact [2+] : Ankle jerk left 2+ [Normal] : normal [Able to toe walk] : the patient was able to toe walk [Able to heel walk] : the patient was able to heel walk [Intact] : all sensory within normal limits bilaterally [Longitudinal] : longitudinal [FreeTextEntry1] : Midthoracic midline [Well-Healed] : well-healed [Cranial Nerves Oculomotor (III)] : extraocular motion intact [Cranial Nerves Optic (II)] : visual acuity intact bilaterally,  pupils equal round and reactive to light [Cranial Nerves Facial (VII)] : face symmetrical [Cranial Nerves Trigeminal (V)] : facial sensation intact symmetrically [Cranial Nerves Vestibulocochlear (VIII)] : hearing was intact bilaterally [Cranial Nerves Glossopharyngeal (IX)] : tongue and palate midline [Cranial Nerves Accessory (XI - Cranial And Spinal)] : head turning and shoulder shrug symmetric [Cranial Nerves Hypoglossal (XII)] : there was no tongue deviation with protrusion [Motor Strength] : muscle strength was normal in all four extremities [No Visual Abnormalities] : no visible abnormalities [Full ROM] : full ROM [Straight-Leg Raise Test - Left] : straight leg raise of the left leg was negative [Straight-Leg Raise Test - Right] : straight leg raise  of the right leg was negative [No Tenderness to Palpation] : no spine tenderness on palpation [Neck Appearance] : the appearance of the neck was normal [] : the neck was supple

## 2020-09-15 NOTE — REVIEW OF SYSTEMS
[Numbness] : numbness [Tingling] : tingling [As Noted in HPI] : as noted in HPI [Limb Pain] : limb pain [Negative] : Genitourinary [FreeTextEntry9] : right leg

## 2020-09-15 NOTE — HISTORY OF PRESENT ILLNESS
[FreeTextEntry1] : Nasrin Esparza is a pleasant right-handed 59-year-old lady who presents for a follow up for her back pain after doing the prescribed imaging. Her last visit with me was on 8/18/20 for multiple spinal pains. She has a history of chronic upper back pain that was thought to be secondary to an arachnoid cyst of the upper thoracic Spine.  She had thoracic spine surgery on 3/28/2019 at [facility] by Dr. Jake Rossi, [phone], and has been unable to follow up with him. However, she says that her pain did not get better. She also reports some neck pain that radiates to the right shoulder and RUE and thoracic pain that radiates to the right rib cage. She also reports lower back cramping pain that is associated with a cramping sensation in the posterior right thigh. This occurs 3-4 times/week and lasts for about 10 minutes. Dr. Alonzo Blancas (Orthopedic Surgeon) ,[address] [place] [address], [address] [phone] advised that she has lumbar spinal stenosis, but she has not had any lumbar surgery and was prescribed PT.\par \par Today she reports that her pains are much better since she started steroids prescribed by rheumatologist Dr. Nena Michelle. She has some neck pain with pain in the right shoulder area.She also reports the occasional sensation of weakness in the right leg after walking long distances. Her pain is relieved with increased activity. She participates in PT twice per week, last visit was in August 2020. Not taking any pain medication at this time. She would like to have a clearance from this office for a colonoscopy with Dr. Rodolfo Lowery (GI - [facility])\par \par Her PCP is Dr. Connie Perry, [facility]. \par Orthopedic Surgeon - Dr. Alonzo Blancas  [address] [place] [address], [address] [phone].\par Rheumatologist - Dr. Nena Michelle , [facility]

## 2020-09-15 NOTE — RESULTS/DATA
[FreeTextEntry1] : \par \par \par EXAM: MR SPINE LUMBAR \par \par EXAM: MR SPINE THORACIC WAW IC \par \par EXAM: MR SPINE CERVICAL WAW IC \par \par \par PROCEDURE DATE: 09/08/2020 \par \par \par \par INTERPRETATION: CERVICAL, THORACIC AND LUMBAR SPINE MRI WITH AND WITHOUT CONTRAST \par \par CLINICAL INFORMATION: Cervical, thoracic, rib and low back pain. Prior thoracic spine surgery. \par TECHNIQUE: Multiplanar and multisequence MR imaging was obtained of the cervical, thoracic and lumbosacral spine with and without contrast. 8 cc of gadolinium this was administered intravenously.. Comparison is made to prior thoracic spine MRI from 5/30/2019 and 2/21/2020 \par \par FINDINGS: \par \par CERVICAL SPINE: \par \par C1/2: There is mild degeneration between the dens and the anterior arch of C1. \par C2/C3: No central canal or foraminal narrowing. \par C3/C4: Mild disc bulging with uncovertebral spurring is present causing mild to moderate bilateral foraminal narrowing. \par C4/C5: There is a small posterior disc protrusion with posterior osteophyte formation causing minimal foraminal narrowing. Disc indents the ventral thecal sac without contact upon the cord. \par C5/C6: Mild disc bulging is present with small foraminal osteophyte formation causing mild bilateral foraminal narrowing. Disc indents the ventral thecal sac without contact upon the cord. \par C6/C7: There is a small broad-based posterior disc protrusion with posterior osteophyte formation indenting the ventral thecal sac without significant stenosis. There is mild foraminal narrowing. \par C7/T1: Normal \par \par There is no cord signal abnormality. There is no abnormal cord enhancement. \par \par There is no bone marrow edema or fracture. \par \par THORACIC SPINE: \par \par The patient is status post laminectomy at T3-T4 with postsurgical scarring posterior to the dura and in the posterior subcutaneous soft tissues with mild subcutaneous edema and reticular infiltration. There is minimal residual contour abnormality of the posterior aspect of the cord at this level without cord signal abnormality or cord displacement. The remainder of the thoracic cord is normal. This is unchanged. There is no abnormal cord enhancement. \par \par There is right-sided endplate marrow edema at the superior endplate of T9 with associated laterally projecting osteophytes. Mild cystic changes seen at the superior endplate of T5. \par \par The facet joints are intact. \par \par LUMBAR SPINE: \par \par The distal cord is normal in appearance. The conus terminates at L1 and is unremarkable. \par \par L1/L2: There is mild disc bulging with mild facet arthrosis. There is no central canal or foraminal narrowing. \par L2/L3: Minimal facet arthrosis present. The disc is intact. \par L3/L4: There is mild disc bulging and mild facet arthrosis. There is mild left foraminal narrowing. \par L4/L5: There is a moderate-sized posterior disc protrusion with mild to moderate facet arthrosis and ligamentous thickening resulting in moderate to severe central canal stenosis. There is minimal foraminal narrowing. \par L5/S1: There is a small posterior disc protrusion with mild facet arthrosis causing minimal central canal stenosis. There is no foraminal narrowing. \par \par There is no bone marrow edema or fracture. \par \par The paravertebral soft tissues are normal. \par \par IMPRESSION: Status post T3-T4 laminectomy with minimal residual contour abnormality of the posterior aspect of the cord at this level without cord displacement, cord signal abnormality or cord enhancement. \par \par Mild multilevel cervical spondylosis. \par \par Moderate-sized posterior disc protrusion at L4-L5 with facet arthrosis and ligamentous thickening resulting in moderate to severe central canal stenosis

## 2020-09-15 NOTE — ASSESSMENT
[FreeTextEntry1] : Impression:\par Multiple joint pains that have improved significantly on prednisone\par No definite cervical radiculopathy\par Mild low back pain with right posterior thigh cramping - intermittent\par L4-5 herniated disc - seem unrelated to her symptoms at present\par \par \par Plan:\par 1: Explained that MRIs of the spine looks ok and no surgical intervention is necessary at this time\par 2: Pain is more likely related to arthritis and advised to work with her Rheumatologist for further management.\par 3:Continue PT for strengthening \par 4:Follow up as needed if the right lower extremity pain becomes worse\par 4:Will provide the clearance letter for colonoscopy. \par Christian Lock MD, FACS, FAANS\par Professor and \par Department of Neurosurgery\par [employer] [facility] [employer] at [facility]\par [address]\[place] [address]\par [phone] Clinical\par [phone] Academic\par \par \par \par

## 2020-09-15 NOTE — DATA REVIEWED
[de-identified] : MRIs of the cervical, thoracic and lumbar spine reviewed. Mild degenerative changes seen. Evidence of previous thoracic laminectomies. The only significant finding is a L4-5 herniated disc.

## 2020-09-18 ENCOUNTER — APPOINTMENT (OUTPATIENT)
Dept: OBGYN | Facility: CLINIC | Age: 60
End: 2020-09-18

## 2020-09-21 ENCOUNTER — APPOINTMENT (OUTPATIENT)
Dept: OBGYN | Facility: CLINIC | Age: 60
End: 2020-09-21
Payer: SELF-PAY

## 2020-09-21 VITALS
TEMPERATURE: 96.2 F | BODY MASS INDEX: 30.76 KG/M2 | HEIGHT: 67 IN | DIASTOLIC BLOOD PRESSURE: 74 MMHG | SYSTOLIC BLOOD PRESSURE: 122 MMHG | WEIGHT: 196 LBS

## 2020-09-21 DIAGNOSIS — Z01.419 ENCOUNTER FOR GYNECOLOGICAL EXAMINATION (GENERAL) (ROUTINE) W/OUT ABNORMAL FINDINGS: ICD-10-CM

## 2020-09-21 PROCEDURE — 99396 PREV VISIT EST AGE 40-64: CPT

## 2020-09-21 NOTE — DISCUSSION/SUMMARY
[FreeTextEntry1] : - Pap/HPV obtained today\par - Mammo WNL from a few weeks ago (August 2020)\par - Colonoscopy scheduled for Oct 2020\par - Recent DEXA \par

## 2020-09-22 ENCOUNTER — OUTPATIENT (OUTPATIENT)
Dept: OUTPATIENT SERVICES | Facility: HOSPITAL | Age: 60
LOS: 1 days | End: 2020-09-22
Payer: MEDICAID

## 2020-09-22 ENCOUNTER — RESULT REVIEW (OUTPATIENT)
Age: 60
End: 2020-09-22

## 2020-09-22 ENCOUNTER — APPOINTMENT (OUTPATIENT)
Dept: ULTRASOUND IMAGING | Facility: CLINIC | Age: 60
End: 2020-09-22
Payer: SELF-PAY

## 2020-09-22 DIAGNOSIS — R10.11 RIGHT UPPER QUADRANT PAIN: ICD-10-CM

## 2020-09-22 PROCEDURE — 76881 US COMPL JOINT R-T W/IMG: CPT | Mod: 26,59,RT

## 2020-09-22 PROCEDURE — 76881 US COMPL JOINT R-T W/IMG: CPT

## 2020-09-22 PROCEDURE — 20611 DRAIN/INJ JOINT/BURSA W/US: CPT

## 2020-09-22 PROCEDURE — 20611 DRAIN/INJ JOINT/BURSA W/US: CPT | Mod: RT

## 2020-09-22 PROCEDURE — 76705 ECHO EXAM OF ABDOMEN: CPT

## 2020-09-22 PROCEDURE — 76705 ECHO EXAM OF ABDOMEN: CPT | Mod: 26

## 2020-09-23 LAB — HPV HIGH+LOW RISK DNA PNL CVX: NOT DETECTED

## 2020-09-25 LAB — CYTOLOGY CVX/VAG DOC THIN PREP: ABNORMAL

## 2020-09-28 ENCOUNTER — APPOINTMENT (OUTPATIENT)
Dept: DERMATOLOGY | Facility: CLINIC | Age: 60
End: 2020-09-28
Payer: MEDICAID

## 2020-09-28 VITALS — WEIGHT: 198 LBS | BODY MASS INDEX: 30.71 KG/M2 | HEIGHT: 67.5 IN

## 2020-09-28 DIAGNOSIS — L82.1 OTHER SEBORRHEIC KERATOSIS: ICD-10-CM

## 2020-09-28 DIAGNOSIS — D18.01 HEMANGIOMA OF SKIN AND SUBCUTANEOUS TISSUE: ICD-10-CM

## 2020-09-28 DIAGNOSIS — Z01.818 ENCOUNTER FOR OTHER PREPROCEDURAL EXAMINATION: ICD-10-CM

## 2020-09-28 DIAGNOSIS — Z12.83 ENCOUNTER FOR SCREENING FOR MALIGNANT NEOPLASM OF SKIN: ICD-10-CM

## 2020-09-28 DIAGNOSIS — D22.9 MELANOCYTIC NEVI, UNSPECIFIED: ICD-10-CM

## 2020-09-28 DIAGNOSIS — Z11.59 ENCOUNTER FOR SCREENING FOR OTHER VIRAL DISEASES: ICD-10-CM

## 2020-09-28 PROCEDURE — 99203 OFFICE O/P NEW LOW 30 MIN: CPT

## 2020-10-08 LAB — SARS-COV-2 N GENE NPH QL NAA+PROBE: NOT DETECTED

## 2020-10-09 ENCOUNTER — RESULT REVIEW (OUTPATIENT)
Age: 60
End: 2020-10-09

## 2020-10-09 ENCOUNTER — OUTPATIENT (OUTPATIENT)
Dept: OUTPATIENT SERVICES | Facility: HOSPITAL | Age: 60
LOS: 1 days | End: 2020-10-09
Payer: MEDICAID

## 2020-10-09 ENCOUNTER — APPOINTMENT (OUTPATIENT)
Dept: MRI IMAGING | Facility: CLINIC | Age: 60
End: 2020-10-09
Payer: MEDICAID

## 2020-10-09 DIAGNOSIS — M75.101 UNSPECIFIED ROTATOR CUFF TEAR OR RUPTURE OF RIGHT SHOULDER, NOT SPECIFIED AS TRAUMATIC: ICD-10-CM

## 2020-10-09 DIAGNOSIS — Z00.8 ENCOUNTER FOR OTHER GENERAL EXAMINATION: ICD-10-CM

## 2020-10-09 PROCEDURE — 73221 MRI JOINT UPR EXTREM W/O DYE: CPT | Mod: 26,RT

## 2020-10-09 PROCEDURE — 73221 MRI JOINT UPR EXTREM W/O DYE: CPT

## 2020-10-12 ENCOUNTER — APPOINTMENT (OUTPATIENT)
Dept: GASTROENTEROLOGY | Facility: AMBULATORY MEDICAL SERVICES | Age: 60
End: 2020-10-12

## 2020-10-12 ENCOUNTER — APPOINTMENT (OUTPATIENT)
Dept: GASTROENTEROLOGY | Facility: AMBULATORY MEDICAL SERVICES | Age: 60
End: 2020-10-12
Payer: MEDICAID

## 2020-10-12 PROCEDURE — 45380 COLONOSCOPY AND BIOPSY: CPT

## 2020-10-12 PROCEDURE — 43239 EGD BIOPSY SINGLE/MULTIPLE: CPT

## 2020-10-14 ENCOUNTER — APPOINTMENT (OUTPATIENT)
Dept: RHEUMATOLOGY | Facility: CLINIC | Age: 60
End: 2020-10-14

## 2020-10-22 ENCOUNTER — NON-APPOINTMENT (OUTPATIENT)
Age: 60
End: 2020-10-22

## 2020-10-29 ENCOUNTER — APPOINTMENT (OUTPATIENT)
Dept: NEUROLOGY | Facility: CLINIC | Age: 60
End: 2020-10-29

## 2020-10-30 ENCOUNTER — APPOINTMENT (OUTPATIENT)
Dept: ORTHOPEDIC SURGERY | Facility: CLINIC | Age: 60
End: 2020-10-30
Payer: MEDICAID

## 2020-10-30 VITALS — SYSTOLIC BLOOD PRESSURE: 108 MMHG | DIASTOLIC BLOOD PRESSURE: 74 MMHG

## 2020-10-30 PROCEDURE — 73030 X-RAY EXAM OF SHOULDER: CPT | Mod: RT

## 2020-10-30 PROCEDURE — 99203 OFFICE O/P NEW LOW 30 MIN: CPT

## 2020-10-30 PROCEDURE — 99072 ADDL SUPL MATRL&STAF TM PHE: CPT

## 2020-10-30 NOTE — HISTORY OF PRESENT ILLNESS
[de-identified] : 58yo female presents complaining of right shoulder and arm pain for several years. She has pain posterior aspect of her shoulder the radius down her arm to her hand. She also states his pain radiates down her right lower back to her right flank and abdomen. She had a neck surgery last March. She saw her spine surgeon recently was told to go to pain management which she has not yet done. She saw a rheumatologist who sent her for ultrasound of her shoulder last month, and also performed an injection with 2 months of relief. She also had an MRI of her right shoulder. She has pain with overhead activity and reaching behind back. Denies numbness tingling\par \par The patient's past medical history, past surgical history, medications, allergies, and social history were reviewed by me today with the patient and documented accordingly. In addition, the patient's family history, which is noncontributory to this visit, was also reviewed.\par

## 2020-10-30 NOTE — DISCUSSION/SUMMARY
[de-identified] : 59-year-old female with right shoulder pain neck pain numbness and tingling into her hand and flank pain. This seems to be radicular in nature. We did discuss her rotator cuff as a potential source of her shoulder pain and impingement although this is unlikely to cause a lot of the other pain that she is feeling. Recommend evaluation of her cervical spine for residual neural compression. She'll start a course of physical therapy for her shoulder she will follow up as needed

## 2020-10-30 NOTE — PHYSICAL EXAM
[de-identified] : General Exam\par \par Well developed, well nourished\par No apparent distress\par Oriented to person, place, and time\par Mood: Normal\par Affect: Normal\par Balance and coordination: Normal\par Gait: Normal\par \par Right shoulder exam\par \par Inspection: No swelling, ecchymosis or gross deformity.\par Skin: No masses, No lesions\par Tenderness: No bicipital tenderness, no tenderness to the greater tuberosity/RTC insertion, no anterior shoulder/lesser tuberosity tenderness. No tenderness SC joint, clavicle, AC joint.\par neck pos spurlings pain w rom\par ROM: 160/60/T6\par Impingement tests: Positive Galindo\par AC Joint: no pain with cross arm testing\par Biceps: Negative speed\par Strength: 5-/5 abduction, 5/5 external rotation, and internal rotation\par Neuro: AIN, PIN, Ulnar nerve motor intact\par Sensation: Intact to light touch in radial, median, ulnar, and axillary nerve distributions\par Vasc: 2+ radial pulse\par  [de-identified] : \par The following radiographs were ordered and read by me during this patients visit. I reviewed each radiograph in detail with the patient and discussed the findings as highlighted below. \par \par 3 views right shoulder] were obtained today that show no fracture, dislocation. There is no degenerative change seen. There is no malalignment. No obvious osseous abnormality. Otherwise unremarkable.\par \par EMG from 2019 reviewed. Median neuropathy. No evidence of cervical radiculopathy.\par \par MRI right shoulder reviewed. Full thickness tear of the supraspinatus tendon. There is mild to moderate glenohumeral arthritis and acromioclavicular arthritis\par \par

## 2020-11-02 ENCOUNTER — APPOINTMENT (OUTPATIENT)
Dept: ORTHOPEDIC SURGERY | Facility: CLINIC | Age: 60
End: 2020-11-02

## 2020-11-06 ENCOUNTER — APPOINTMENT (OUTPATIENT)
Dept: CT IMAGING | Facility: CLINIC | Age: 60
End: 2020-11-06
Payer: MEDICAID

## 2020-11-06 ENCOUNTER — NON-APPOINTMENT (OUTPATIENT)
Age: 60
End: 2020-11-06

## 2020-11-06 ENCOUNTER — OUTPATIENT (OUTPATIENT)
Dept: OUTPATIENT SERVICES | Facility: HOSPITAL | Age: 60
LOS: 1 days | End: 2020-11-06
Payer: MEDICAID

## 2020-11-06 DIAGNOSIS — K21.9 GASTRO-ESOPHAGEAL REFLUX DISEASE WITHOUT ESOPHAGITIS: ICD-10-CM

## 2020-11-06 DIAGNOSIS — Z00.8 ENCOUNTER FOR OTHER GENERAL EXAMINATION: ICD-10-CM

## 2020-11-06 PROCEDURE — 74160 CT ABDOMEN W/CONTRAST: CPT

## 2020-11-06 PROCEDURE — 74160 CT ABDOMEN W/CONTRAST: CPT | Mod: 26

## 2020-11-09 ENCOUNTER — APPOINTMENT (OUTPATIENT)
Dept: ORTHOPEDIC SURGERY | Facility: CLINIC | Age: 60
End: 2020-11-09
Payer: MEDICAID

## 2020-11-09 PROCEDURE — 99072 ADDL SUPL MATRL&STAF TM PHE: CPT

## 2020-11-09 PROCEDURE — 99214 OFFICE O/P EST MOD 30 MIN: CPT

## 2020-11-12 ENCOUNTER — APPOINTMENT (OUTPATIENT)
Dept: NEUROLOGY | Facility: CLINIC | Age: 60
End: 2020-11-12
Payer: MEDICAID

## 2020-11-12 VITALS
WEIGHT: 196 LBS | DIASTOLIC BLOOD PRESSURE: 72 MMHG | SYSTOLIC BLOOD PRESSURE: 94 MMHG | HEART RATE: 82 BPM | BODY MASS INDEX: 30.4 KG/M2 | HEIGHT: 67.5 IN

## 2020-11-12 VITALS — TEMPERATURE: 96.4 F

## 2020-11-12 PROCEDURE — 99203 OFFICE O/P NEW LOW 30 MIN: CPT

## 2020-11-12 PROCEDURE — 99072 ADDL SUPL MATRL&STAF TM PHE: CPT

## 2020-11-12 NOTE — CONSULT LETTER
[Dear  ___] : Dear  [unfilled], [Consult Letter:] : I had the pleasure of evaluating your patient, [unfilled]. [Please see my note below.] : Please see my note below. [Consult Closing:] : Thank you very much for allowing me to participate in the care of this patient.  If you have any questions, please do not hesitate to contact me. [Sincerely,] : Sincerely, [FreeTextEntry2] : Connie Perry MD

## 2020-11-12 NOTE — HISTORY OF PRESENT ILLNESS
[FreeTextEntry1] : 59-year-old woman provides a history of trouble concentrating since childhood. Her primary care physician 6 years ago suspected attention deficit disorder and referred her for neurologic evaluation. Neurologist, Dr. Pearl, referred patient for neuropsychologic assessment, but no report is in our electronic health record.\par \par Currently she is in school studying to be a  and finds it very difficult retaining information and concentration.\par \par She has a history of a thoracic arachnoid cyst compressing the cord. This was resected by Dr. Rossi at [facility] last year.

## 2020-11-12 NOTE — ASSESSMENT
[FreeTextEntry1] : History is consistent with attention deficit disorder. Advised a trial of methylphenidate. She will return for reassessment in one month.

## 2020-11-16 ENCOUNTER — APPOINTMENT (OUTPATIENT)
Dept: INTERNAL MEDICINE | Facility: CLINIC | Age: 60
End: 2020-11-16

## 2020-12-15 ENCOUNTER — APPOINTMENT (OUTPATIENT)
Dept: SPINE | Facility: CLINIC | Age: 60
End: 2020-12-15
Payer: MEDICAID

## 2020-12-15 DIAGNOSIS — Z98.890 OTHER SPECIFIED POSTPROCEDURAL STATES: ICD-10-CM

## 2020-12-15 PROCEDURE — 99203 OFFICE O/P NEW LOW 30 MIN: CPT | Mod: 95

## 2020-12-16 PROBLEM — Z98.890 HISTORY OF THORACIC SURGERY: Status: ACTIVE | Noted: 2020-12-16

## 2020-12-17 ENCOUNTER — APPOINTMENT (OUTPATIENT)
Dept: NEUROLOGY | Facility: CLINIC | Age: 60
End: 2020-12-17

## 2020-12-18 ENCOUNTER — RX RENEWAL (OUTPATIENT)
Age: 60
End: 2020-12-18

## 2020-12-23 PROBLEM — Z12.11 ENCOUNTER FOR SCREENING COLONOSCOPY: Status: RESOLVED | Noted: 2020-09-09 | Resolved: 2020-12-23

## 2021-01-07 ENCOUNTER — APPOINTMENT (OUTPATIENT)
Dept: NEUROSURGERY | Facility: CLINIC | Age: 61
End: 2021-01-07
Payer: MEDICAID

## 2021-01-07 VITALS — BODY MASS INDEX: 28.79 KG/M2 | WEIGHT: 190 LBS | HEIGHT: 68 IN

## 2021-01-07 PROCEDURE — 99215 OFFICE O/P EST HI 40 MIN: CPT | Mod: 95

## 2021-01-07 RX ORDER — SODIUM SULFATE, POTASSIUM SULFATE, MAGNESIUM SULFATE 17.5; 3.13; 1.6 G/ML; G/ML; G/ML
17.5-3.13-1.6 SOLUTION, CONCENTRATE ORAL
Qty: 1 | Refills: 0 | Status: DISCONTINUED | COMMUNITY
Start: 2020-09-09 | End: 2021-01-07

## 2021-01-07 RX ORDER — POLYETHYLENE GLYCOL 3350 AND ELECTROLYTES WITH LEMON FLAVOR 236; 22.74; 6.74; 5.86; 2.97 G/4L; G/4L; G/4L; G/4L; G/4L
236 POWDER, FOR SOLUTION ORAL
Qty: 1 | Refills: 0 | Status: DISCONTINUED | COMMUNITY
Start: 2020-10-09 | End: 2021-01-07

## 2021-01-07 RX ORDER — PREDNISONE 20 MG/1
20 TABLET ORAL
Qty: 30 | Refills: 0 | Status: DISCONTINUED | COMMUNITY
Start: 2020-09-08 | End: 2021-01-07

## 2021-01-07 RX ORDER — FAMOTIDINE 20 MG/1
20 TABLET ORAL DAILY
Qty: 30 | Refills: 2 | Status: DISCONTINUED | COMMUNITY
Start: 2020-10-12 | End: 2021-01-07

## 2021-01-07 RX ORDER — POLYETHYLENE GLYCOL 3350 17 G/17G
17 POWDER, FOR SOLUTION ORAL DAILY
Qty: 1 | Refills: 2 | Status: DISCONTINUED | COMMUNITY
Start: 2020-09-09 | End: 2021-01-07

## 2021-01-07 NOTE — CONSULT LETTER
[Dear  ___] : Dear ~MIGUELINA, [FreeTextEntry2] : Nena Michelle NP\par Rheumatology\par [facility]

## 2021-01-07 NOTE — ASSESSMENT
[FreeTextEntry1] : IMPRESSION:\par Chronic pain in several joints and at the site of her previous thoracic surgery elsewhere (for an arachnoid cyst that was thought to be the cause of upper back pain).\par Likely arthritic pain\par Lumber disc herniation at L4-5 that appears to be asymptomatic\par \par \par PLAN:\par I do not recommend any surgical intervention at present. \par I have advised Ms. Esparza to continue to be followed and treated by her Rheumatology NP Nena Michelle\par \par Christian Lock MD, FACS, FAANS\par Professor and \par Department of Neurosurgery\par [facility] [employer] at [facility]\par [address] [place] [address], [address] [place] [address]\[place] [address]\par [phone] Clinical\par [phone] Academic\par \par

## 2021-01-07 NOTE — PHYSICAL EXAM
[General Appearance - Alert] : alert [General Appearance - In No Acute Distress] : in no acute distress [General Appearance - Well Nourished] : well nourished [General Appearance - Well Developed] : well developed [General Appearance - Well-Appearing] : healthy appearing [Person] : oriented to person [Place] : oriented to place [Time] : oriented to time [] : no respiratory distress

## 2021-01-07 NOTE — DATA REVIEWED
[de-identified] : MRIs of the cervical and thoracic spine do not show any compressive lesion that could benefit from surgery.Lumbar spine MRI from 9/8/20 shows a central disc herniation at L4-5 with moderate spinal stenosis.

## 2021-01-07 NOTE — HISTORY OF PRESENT ILLNESS
[Home] : at home, [unfilled] , at the time of the visit. [Medical Office: ([facility])___] : at the medical office located in  [Verbal consent obtained from patient] : the patient, [unfilled] [FreeTextEntry1] : Nasrin Esparza is a pleasant right-handed 60-year-old lady who has a history of chronic upper back pain that was thought to be secondary to an arachnoid cyst of the upper thoracic spine. She had thoracic spine surgery on 3/28/2019 at [facility] by Dr. Jake Rossi, [phone], and she reports that her pain did not get better. She also reports some neck pain that radiates to the right shoulder and RUE and thoracic pain that radiates to the right rib cage. This occurs 3-4 times/week and lasts for about 10 minutes. Dr. Alonzo Blancas (Orthopedic Surgeon) ,[address] [place] [address], [address] [phone] advised that she has lumbar spinal stenosis, but she has not had any lumbar surgery and was prescribed PT.\par \par Today she reports that currently in posterior neck and radiates right shoulder >  left shoulder. Pain is 8-9/10 and is more prominent in the evening when resting.  Pain is controlled with Advil prn and Ibuprofen 800 mg prn. She also reports weakness in the right upper extremity especially when writing.  She denies numbness and tingling.\par \par She participated in PT sessions that ended early November 2020.  She expressed that she is not interested in seeing pain management. She has been unable to see Dr. Rossi who operated on her at [place]. She has been seen by two other spine surgeons - Igor Tierney and Richard Singleton.\par \par Her PCP is Dr. Connie Perry, [facility]. \par Orthopedic Surgeon - Dr. Alonzo Blancas [address] [place] [address], [address] [phone].\par Rheumatologist - Nena Michelle NP,  [facility] \par

## 2021-01-11 ENCOUNTER — APPOINTMENT (OUTPATIENT)
Dept: INTERNAL MEDICINE | Facility: CLINIC | Age: 61
End: 2021-01-11

## 2021-01-19 ENCOUNTER — APPOINTMENT (OUTPATIENT)
Dept: RHEUMATOLOGY | Facility: CLINIC | Age: 61
End: 2021-01-19
Payer: MEDICAID

## 2021-01-19 DIAGNOSIS — R20.2 PARESTHESIA OF SKIN: ICD-10-CM

## 2021-01-19 PROCEDURE — 99443: CPT

## 2021-01-19 NOTE — REVIEW OF SYSTEMS
[Abdominal Pain] : abdominal pain [As Noted in HPI] : as noted in HPI [Joint Pain] : joint pain [Negative] : Heme/Lymph

## 2021-01-24 NOTE — ASSESSMENT
[FreeTextEntry1] : 59 year-old female with bilateral symmetrical joint pain/stiffness\par \par \par \par 1. Thoracic and cervical DDD - no spnal stenosis - but  ongoing right side thoracic pain - maybe related to complete tear of the right shoulder - send for EMG - start gabapentin\par 2. RC tear - complete - needs surgery - has postpone due to pandemic - prednisone 20 mg daily X 7 days - to help with pain - may continue afterwards with NSAIDS\par \par This was a Telephonic encounter in  audio communication was utilized. Risks and benefits of receiving Telehealth services has been discussed with the patient. The patient has been given ample opportunity to discuss any questions regarding [facility]’s telehealth services. All of the patients questions have been answered to satisfaction.\par Verbal consent was obtain\par Provider Location: [address] [place] [address]\par Patient Location: [address] [place] [address]\par Duration: 25 minutes\par \par I reviewed previous labs results with patients.\par Diagnosis and Prognosis discussed\par Continue with current medications\par medications refilled\par education provided on prednisone and gabapentin\par F/u 2 months\par \par

## 2021-01-24 NOTE — PHYSICAL EXAM
[General Appearance - Alert] : alert [General Appearance - In No Acute Distress] : in no acute distress [Neck Appearance] : the appearance of the neck was normal [Neck Cervical Mass (___cm)] : no neck mass was observed [Jugular Venous Distention Increased] : there was no jugular-venous distention [Thyroid Diffuse Enlargement] : the thyroid was not enlarged [Thyroid Nodule] : there were no palpable thyroid nodules [Auscultation Breath Sounds / Voice Sounds] : lungs were clear to auscultation bilaterally [Heart Rate And Rhythm] : heart rate was normal and rhythm regular [Heart Sounds] : normal S1 and S2 [Heart Sounds Gallop] : no gallops [Murmurs] : no murmurs [Heart Sounds Pericardial Friction Rub] : no pericardial rub [Full Pulse] : the pedal pulses are present [Edema] : there was no peripheral edema [No CVA Tenderness] : no ~M costovertebral angle tenderness [No Spinal Tenderness] : no spinal tenderness [Skin Color & Pigmentation] : normal skin color and pigmentation [Skin Turgor] : normal skin turgor [] : no rash [Oriented To Time, Place, And Person] : oriented to person, place, and time [Impaired Insight] : insight and judgment were intact [Affect] : the affect was normal [FreeTextEntry1] : shoulder ongoing pain worse with movement

## 2021-01-24 NOTE — HISTORY OF PRESENT ILLNESS
[___ Month(s) Ago] : [unfilled] month(s) ago [Arthralgias] : arthralgias [FreeTextEntry1] : ongoing shoulder pain radiating to the right arm\par recent MRi of the right shoulder - with full thickness tear of the anterior/mid insertional fibers\par needs surgery - but is on hold due to work related issues\par she has severe pain over the posterior rib cage radiating to the front\par taking ibuprofen had prednisone in the past with mild improvement\par had gabapentin in the past - but only after surgery

## 2021-01-31 ENCOUNTER — RX RENEWAL (OUTPATIENT)
Age: 61
End: 2021-01-31

## 2021-01-31 ENCOUNTER — NON-APPOINTMENT (OUTPATIENT)
Age: 61
End: 2021-01-31

## 2021-03-05 LAB — SARS-COV-2 N GENE NPH QL NAA+PROBE: NOT DETECTED

## 2021-03-09 ENCOUNTER — APPOINTMENT (OUTPATIENT)
Dept: INTERNAL MEDICINE | Facility: CLINIC | Age: 61
End: 2021-03-09
Payer: MEDICAID

## 2021-03-09 ENCOUNTER — NON-APPOINTMENT (OUTPATIENT)
Age: 61
End: 2021-03-09

## 2021-03-09 VITALS
RESPIRATION RATE: 17 BRPM | WEIGHT: 193 LBS | BODY MASS INDEX: 29.25 KG/M2 | DIASTOLIC BLOOD PRESSURE: 80 MMHG | OXYGEN SATURATION: 99 % | TEMPERATURE: 97.9 F | HEART RATE: 85 BPM | SYSTOLIC BLOOD PRESSURE: 136 MMHG | HEIGHT: 68 IN

## 2021-03-09 DIAGNOSIS — R10.11 RIGHT UPPER QUADRANT PAIN: ICD-10-CM

## 2021-03-09 DIAGNOSIS — R41.3 OTHER AMNESIA: ICD-10-CM

## 2021-03-09 DIAGNOSIS — Z87.39 PERSONAL HISTORY OF OTHER DISEASES OF THE MUSCULOSKELETAL SYSTEM AND CONNECTIVE TISSUE: ICD-10-CM

## 2021-03-09 PROCEDURE — 99072 ADDL SUPL MATRL&STAF TM PHE: CPT

## 2021-03-09 PROCEDURE — 99214 OFFICE O/P EST MOD 30 MIN: CPT

## 2021-03-10 DIAGNOSIS — E87.0 HYPEROSMOLALITY AND HYPERNATREMIA: ICD-10-CM

## 2021-03-10 LAB
ALBUMIN SERPL ELPH-MCNC: 4.2 G/DL
ALP BLD-CCNC: 93 U/L
ALT SERPL-CCNC: 19 U/L
AMYLASE/CREAT SERPL: 103 U/L
ANION GAP SERPL CALC-SCNC: 15 MMOL/L
AST SERPL-CCNC: 15 U/L
BILIRUB SERPL-MCNC: 0.3 MG/DL
BUN SERPL-MCNC: 15 MG/DL
CALCIUM SERPL-MCNC: 10.1 MG/DL
CHLORIDE SERPL-SCNC: 109 MMOL/L
CO2 SERPL-SCNC: 23 MMOL/L
CREAT SERPL-MCNC: 0.79 MG/DL
GLUCOSE SERPL-MCNC: 85 MG/DL
LPL SERPL-CCNC: 31 U/L
POTASSIUM SERPL-SCNC: 4.4 MMOL/L
PROT SERPL-MCNC: 7.2 G/DL
SODIUM SERPL-SCNC: 147 MMOL/L

## 2021-03-11 ENCOUNTER — APPOINTMENT (OUTPATIENT)
Dept: GASTROENTEROLOGY | Facility: CLINIC | Age: 61
End: 2021-03-11
Payer: MEDICAID

## 2021-03-11 VITALS
BODY MASS INDEX: 28.79 KG/M2 | TEMPERATURE: 97.1 F | SYSTOLIC BLOOD PRESSURE: 120 MMHG | OXYGEN SATURATION: 98 % | HEART RATE: 85 BPM | DIASTOLIC BLOOD PRESSURE: 78 MMHG | HEIGHT: 68 IN | WEIGHT: 190 LBS

## 2021-03-11 PROCEDURE — 99072 ADDL SUPL MATRL&STAF TM PHE: CPT

## 2021-03-11 PROCEDURE — 99212 OFFICE O/P EST SF 10 MIN: CPT

## 2021-03-11 RX ORDER — PREDNISONE 20 MG/1
20 TABLET ORAL
Qty: 21 | Refills: 1 | Status: DISCONTINUED | COMMUNITY
Start: 2021-01-19 | End: 2021-03-11

## 2021-03-11 RX ORDER — FAMOTIDINE 20 MG/1
20 TABLET, FILM COATED ORAL
Qty: 30 | Refills: 2 | Status: DISCONTINUED | COMMUNITY
Start: 2021-01-31 | End: 2021-03-11

## 2021-03-11 NOTE — PHYSICAL EXAM
[General Appearance - Alert] : alert [General Appearance - In No Acute Distress] : in no acute distress [General Appearance - Well Nourished] : well nourished [General Appearance - Well Developed] : well developed [General Appearance - Well-Appearing] : healthy appearing [Sclera] : the sclera and conjunctiva were normal [Neck Appearance] : the appearance of the neck was normal [Neck Cervical Mass (___cm)] : no neck mass was observed [Jugular Venous Distention Increased] : there was no jugular-venous distention [FreeTextEntry1] : Full range of motion, scar in the midline of the very upper back [Auscultation Breath Sounds / Voice Sounds] : lungs were clear to auscultation bilaterally [Apical Impulse] : the apical impulse was normal [Heart Sounds] : normal S1 and S2 [Full Pulse] : the pedal pulses are present [Edema] : there was no peripheral edema [Bowel Sounds] : normal bowel sounds [Abdomen Soft] : soft [Abdomen Tenderness] : non-tender [Abdomen Mass (___ Cm)] : no abdominal mass palpated [Cervical Lymph Nodes Enlarged Posterior Bilaterally] : posterior cervical [Cervical Lymph Nodes Enlarged Anterior Bilaterally] : anterior cervical [Supraclavicular Lymph Nodes Enlarged Bilaterally] : supraclavicular [Axillary Lymph Nodes Enlarged Bilaterally] : axillary [Femoral Lymph Nodes Enlarged Bilaterally] : femoral [Inguinal Lymph Nodes Enlarged Bilaterally] : inguinal [Abnormal Walk] : normal gait [Nail Clubbing] : no clubbing  or cyanosis of the fingernails [Musculoskeletal - Swelling] : no joint swelling seen [Motor Tone] : muscle strength and tone were normal [Skin Color & Pigmentation] : normal skin color and pigmentation [Skin Turgor] : normal skin turgor [] : no rash [No Focal Deficits] : no focal deficits [Oriented To Time, Place, And Person] : oriented to person, place, and time [Impaired Insight] : insight and judgment were intact [Affect] : the affect was normal

## 2021-03-11 NOTE — REASON FOR VISIT
[FreeTextEntry1] : Mild GERD, currently diet controlled, history of colon polyp but not due for colonoscopy for 5 years, having right flank, thoracic spine discomfort radiating anteriorly

## 2021-03-11 NOTE — HISTORY OF PRESENT ILLNESS
[de-identified] : Dr. Hull takes care of this very pleasant 60-year-old female\par \par Seen previously for abdominal discomfort with negative CAT scan, upper endoscopy showing mild gastritis and colonoscopy with a small polyp removed with instructions to repeat colonoscopy in 5 years\par \par Currently no abdominal complaints\par \par Heartburn totally controlled on diet alone, previously on Pepcid\par \par She has chronic unrelenting discomfort originating from her right thoracic spine and radiating anteriorly around the ribs\par \par She has been seen by orthopedic surgeon and would like a second opinion

## 2021-03-12 ENCOUNTER — APPOINTMENT (OUTPATIENT)
Dept: PHYSICAL MEDICINE AND REHAB | Facility: CLINIC | Age: 61
End: 2021-03-12

## 2021-03-16 ENCOUNTER — APPOINTMENT (OUTPATIENT)
Dept: PHYSICAL MEDICINE AND REHAB | Facility: CLINIC | Age: 61
End: 2021-03-16
Payer: MEDICAID

## 2021-03-16 VITALS
HEART RATE: 63 BPM | SYSTOLIC BLOOD PRESSURE: 115 MMHG | WEIGHT: 191 LBS | DIASTOLIC BLOOD PRESSURE: 79 MMHG | HEIGHT: 67.5 IN | TEMPERATURE: 97.3 F | OXYGEN SATURATION: 99 % | BODY MASS INDEX: 29.63 KG/M2

## 2021-03-16 DIAGNOSIS — M54.9 DORSALGIA, UNSPECIFIED: ICD-10-CM

## 2021-03-16 DIAGNOSIS — M47.814 SPONDYLOSIS W/OUT MYELOPATHY OR RADICULOPATHY, THORACIC REGION: ICD-10-CM

## 2021-03-16 PROCEDURE — 99072 ADDL SUPL MATRL&STAF TM PHE: CPT

## 2021-03-16 PROCEDURE — 99204 OFFICE O/P NEW MOD 45 MIN: CPT

## 2021-03-18 ENCOUNTER — LABORATORY RESULT (OUTPATIENT)
Age: 61
End: 2021-03-18

## 2021-03-18 DIAGNOSIS — Z20.822 CONTACT WITH AND (SUSPECTED) EXPOSURE TO COVID-19: ICD-10-CM

## 2021-03-18 NOTE — DATA REVIEWED
[FreeTextEntry1] : \par  MR Thoracic Spine w/wo IV Cont             Final\par \par No Documents Attached\par \par \par \par \par   EXAM:  MR SPINE LUMBAR\par \par EXAM:  MR SPINE THORACIC WAW IC\par \par EXAM:  MR SPINE CERVICAL WAW IC\par \par \par PROCEDURE DATE:  09/08/2020\par \par \par \par INTERPRETATION:  CERVICAL, THORACIC AND LUMBAR SPINE MRI WITH AND WITHOUT CONTRAST\par \par CLINICAL INFORMATION: Cervical, thoracic, rib and low back pain. Prior thoracic spine surgery.\par TECHNIQUE: Multiplanar and multisequence MR imaging was obtained of the cervical, thoracic and lumbosacral spine with and without contrast. 8 cc of gadolinium this was administered intravenously.. Comparison is made to prior thoracic spine MRI from 5/30/2019 and 2/21/2020\par \par FINDINGS:\par \par CERVICAL SPINE:\par \par C1/2: There is mild degeneration between the dens and the anterior arch of C1.\par C2/C3: No central canal or foraminal narrowing.\par C3/C4: Mild disc bulging with uncovertebral spurring is present causing mild to moderate bilateral foraminal narrowing.\par C4/C5: There is a small posterior disc protrusion with posterior osteophyte formation causing minimal foraminal narrowing. Disc indents the ventral thecal sac without contact upon the cord.\par C5/C6: Mild disc bulging is present with small foraminal osteophyte formation causing mild bilateral foraminal narrowing. Disc indents the ventral thecal sac without contact upon the cord.\par C6/C7: There is a small broad-based posterior disc protrusion with posterior osteophyte formation indenting the ventral thecal sac without significant stenosis. There is mild foraminal narrowing.\par C7/T1: Normal\par \par There is no cord signal abnormality. There is no abnormal cord enhancement.\par \par There is no bone marrow edema or fracture.\par \par THORACIC SPINE:\par \par The patient is status post laminectomy at T3-T4 with postsurgical scarring posterior to the dura and in the posterior subcutaneous soft tissues with mild subcutaneous edema and reticular infiltration. There is minimal residual contour abnormality of the posterior aspect of the cord at this level without cord signal abnormality or cord displacement. The remainder of the thoracic cord is normal. This is unchanged. There is no abnormal cord enhancement.\par \par There is right-sided endplate marrow edema at the superior endplate of T9 with associated laterally projecting osteophytes. Mild cystic changes seen at the superior endplate of T5.\par \par The facet joints are intact.\par \par LUMBAR SPINE:\par \par The distal cord is normal in appearance. The conus terminates at L1 and is unremarkable.\par \par L1/L2: There is mild disc bulging with mild facet arthrosis. There is no central canal or foraminal narrowing.\par L2/L3: Minimal facet arthrosis present. The disc is intact.\par L3/L4: There is mild disc bulging and mild facet arthrosis. There is mild left foraminal narrowing.\par L4/L5: There is a moderate-sized posterior disc protrusion with mild to moderate facet arthrosis and ligamentous thickening resulting in moderate to severe central canal stenosis. There is minimal foraminal narrowing.\par L5/S1: There is a small posterior disc protrusion with mild facet arthrosis causing minimal central canal stenosis. There is no foraminal narrowing.\par \par There is no bone marrow edema or fracture.\par \par The paravertebral soft tissues are normal.\par \par IMPRESSION: Status post T3-T4 laminectomy with minimal residual contour abnormality of the posterior aspect of the cord at this level without cord displacement, cord signal abnormality or cord enhancement.\par \par Mild multilevel cervical spondylosis.\par \par Moderate-sized posterior disc protrusion at L4-L5 with facet arthrosis and ligamentous thickening resulting in moderate to severe central canal stenosis.\par \par \par \par \par \par \par JAMARCUS MCCARTHY M.D., ATTENDING RADIOLOGIST Phone #: [phone]\par This document has been electronically signed. Sep  9 2020  4:10PM\par \par  \par \par  Ordered by: VIKRAM RAJAN       Collected/Examined: 08Sep2020 04:20PM       \par Verified by: VIKRAM RAJAN 09Sep2020 04:33PM       \par  Result Communication: No patient communication needed at this time;\par Stage: Final       \par  Performed at: [facility]       Resulted: 09Sep2020 04:13PM       Last Updated: 09Sep2020 04:33PM       Accession: [id number]

## 2021-03-18 NOTE — REVIEW OF SYSTEMS
[Negative] : Neurological [Shortness Of Breath] : no shortness of breath [FreeTextEntry9] : as -per hpi

## 2021-03-18 NOTE — PHYSICAL EXAM
[FreeTextEntry1] : General:  Awake and alert in no acute distress\par Psych: normal mood and affect\par HEENT: NC/AT, normal visual tracking\par Pulmonary: no resp distress, chest expansion appears symmetrical\par CV: extremities are warm and perfused\par Abd: non-distended\par Ext: no c/c/e\par \par Palpation: Tender at lower thoracic paraspinals on right \par \par ROM: full in flexion, extension, sidebending and rotation with pain with end of extension on the right\par \par Strength:  HF, KE, KF, DF, PF, EHL all 5/5 except right HF 4/5\par \par Sensation: Intact at L2-S1 dermatomes bilaterally to light touch \par \par Reflexes: 2+/4 at bilateral Patellar (L2-4) and Achilles (S1)  no clonus bilat.\par \par SLR: negative\par facet loading positive with pain on the right\par \par FADIR/VINCENT: negative\par  pain with resisted activation of the right lower trapeezius/thoracic paraspinals muscle

## 2021-03-18 NOTE — HISTORY OF PRESENT ILLNESS
[FreeTextEntry1] : Ms. MOSES STONE is a 60 year old  female who presents with mid-low back pain. Patient has a history of a arachnoid cyst in her thoracic spine, seen by neurosurgery.\par \par Location: right lower thoracic spine paraspinal region/flank\par Onset: 2017/2018, patient said she has had this pain even prior to surgery. \par Provocation: no identifying factors\par Palliative: prednisone and advil\par Quality: gnawing/sharp\par Radiation: from back into anterior abdomen occasionally (mostly stays in paraspinal region) ,lower back pain with intermittent radiation into right leg\par Severity: 8/10\par Timing: intermittent\par \par Denies any associated numbness. Denies any associated leg weakness. Denies any loss of bowel/bladder control or any groin numbness.\par Previous medications trialed:  ibuprofen 800mg, prednisone (received for shoulder), gabapentin (300mg Qhs PRN)\par Previous procedures relevant to complaint:Previous T3-4 laminectomy for arachnoid cyst in 2019 however no change in pain\par Conservative therapy tried?: accupunture\par \par of note patient is allergic to tylenol\par

## 2021-03-18 NOTE — ASSESSMENT
[FreeTextEntry1] : Plan: After review of the history, physical examination, and imaging, the patient's symptoms are consistent with possible thoracic myofascial pain as well as thoracic/lumbar spondylosis. Patient's primary pain is lower in the spine than where the arachnoid cyst was found. Patient also claims to have had this pain even before the cyst was found/she had surgery. Patient denies any red flags at this time. We discussed all the potential treatment options including physical therapy, oral medication, USG/Fluoroscopic interventional procedures. Recommended:\par -Patient will look up USG trigger point injection to the upper thoracic/lower lumbar paraspinals and let me know if she would like to try them.\par - Agree with patient on second opinion of orthopedic spine surgeon, patient said she will look into it. \par - Patient may benefit from a thoracic medial branch block/facet joint injection for which I do not perform but can refer patient out to be evaluated for it. She would like to look up the trigger point injection procedure first. \par \par Patient to follow up with me as needed. She may scheduled the US guided trigger point injections as needed. \par \par \par  The patient expressed verbal understanding and is in agreement with the plan of care. All of the patient's questions and concerns were addressed during today's visit.\par \par I spent a total of 48 minutes on this encounter including documentation, face-to-face time and reviewing prior records. \par

## 2021-03-21 LAB
ANION GAP SERPL CALC-SCNC: 13 MMOL/L
BUN SERPL-MCNC: 14 MG/DL
CALCIUM SERPL-MCNC: 9.5 MG/DL
CHLORIDE SERPL-SCNC: 106 MMOL/L
CO2 SERPL-SCNC: 24 MMOL/L
COVID-19 NUCLEOCAPSID  GAM ANTIBODY INTERPRETATION: NEGATIVE
CREAT SERPL-MCNC: 0.82 MG/DL
GLUCOSE SERPL-MCNC: 150 MG/DL
POTASSIUM SERPL-SCNC: 3.9 MMOL/L
SARS-COV-2 AB SERPL QL IA: 0.08 INDEX
SODIUM SERPL-SCNC: 143 MMOL/L

## 2021-06-03 ENCOUNTER — APPOINTMENT (OUTPATIENT)
Dept: INTERNAL MEDICINE | Facility: CLINIC | Age: 61
End: 2021-06-03
Payer: MEDICAID

## 2021-06-03 VITALS
WEIGHT: 199 LBS | HEART RATE: 87 BPM | HEIGHT: 67.5 IN | RESPIRATION RATE: 17 BRPM | SYSTOLIC BLOOD PRESSURE: 110 MMHG | DIASTOLIC BLOOD PRESSURE: 73 MMHG | BODY MASS INDEX: 30.87 KG/M2 | OXYGEN SATURATION: 98 % | TEMPERATURE: 97.4 F

## 2021-06-03 DIAGNOSIS — M79.18 MYALGIA, OTHER SITE: ICD-10-CM

## 2021-06-03 PROCEDURE — 99214 OFFICE O/P EST MOD 30 MIN: CPT

## 2021-06-08 ENCOUNTER — RX RENEWAL (OUTPATIENT)
Age: 61
End: 2021-06-08

## 2021-06-16 ENCOUNTER — APPOINTMENT (OUTPATIENT)
Dept: ORTHOPEDIC SURGERY | Facility: CLINIC | Age: 61
End: 2021-06-16
Payer: MEDICAID

## 2021-06-16 VITALS — HEIGHT: 67.5 IN | BODY MASS INDEX: 31.02 KG/M2 | WEIGHT: 200 LBS

## 2021-06-16 PROCEDURE — 72100 X-RAY EXAM L-S SPINE 2/3 VWS: CPT

## 2021-06-16 PROCEDURE — 72084 X-RAY EXAM ENTIRE SPI 6/> VW: CPT

## 2021-06-16 PROCEDURE — 72070 X-RAY EXAM THORAC SPINE 2VWS: CPT

## 2021-06-16 PROCEDURE — 72040 X-RAY EXAM NECK SPINE 2-3 VW: CPT

## 2021-06-16 PROCEDURE — 99214 OFFICE O/P EST MOD 30 MIN: CPT

## 2021-06-30 ENCOUNTER — APPOINTMENT (OUTPATIENT)
Dept: ORTHOPEDIC SURGERY | Facility: CLINIC | Age: 61
End: 2021-06-30

## 2021-08-17 ENCOUNTER — APPOINTMENT (OUTPATIENT)
Dept: INTERNAL MEDICINE | Facility: CLINIC | Age: 61
End: 2021-08-17
Payer: MEDICAID

## 2021-08-17 DIAGNOSIS — Z11.52 ENCOUNTER FOR SCREENING FOR COVID-19: ICD-10-CM

## 2021-08-17 PROCEDURE — 99442: CPT

## 2021-08-22 LAB — SARS-COV-2 N GENE NPH QL NAA+PROBE: NOT DETECTED

## 2021-10-02 ENCOUNTER — OUTPATIENT (OUTPATIENT)
Dept: OUTPATIENT SERVICES | Facility: HOSPITAL | Age: 61
LOS: 1 days | End: 2021-10-02
Payer: MEDICAID

## 2021-10-02 ENCOUNTER — APPOINTMENT (OUTPATIENT)
Dept: MAMMOGRAPHY | Facility: CLINIC | Age: 61
End: 2021-10-02
Payer: MEDICAID

## 2021-10-02 ENCOUNTER — RESULT REVIEW (OUTPATIENT)
Age: 61
End: 2021-10-02

## 2021-10-02 DIAGNOSIS — Z00.8 ENCOUNTER FOR OTHER GENERAL EXAMINATION: ICD-10-CM

## 2021-10-02 DIAGNOSIS — Z12.39 ENCOUNTER FOR OTHER SCREENING FOR MALIGNANT NEOPLASM OF BREAST: ICD-10-CM

## 2021-10-02 PROCEDURE — 77063 BREAST TOMOSYNTHESIS BI: CPT | Mod: 26

## 2021-10-02 PROCEDURE — 77063 BREAST TOMOSYNTHESIS BI: CPT

## 2021-10-02 PROCEDURE — 77067 SCR MAMMO BI INCL CAD: CPT | Mod: 26

## 2021-10-02 PROCEDURE — 77067 SCR MAMMO BI INCL CAD: CPT

## 2021-10-03 LAB — SARS-COV-2 N GENE NPH QL NAA+PROBE: NOT DETECTED

## 2021-12-03 ENCOUNTER — APPOINTMENT (OUTPATIENT)
Dept: OBGYN | Facility: CLINIC | Age: 61
End: 2021-12-03
Payer: MEDICAID

## 2021-12-03 ENCOUNTER — NON-APPOINTMENT (OUTPATIENT)
Age: 61
End: 2021-12-03

## 2021-12-03 VITALS
DIASTOLIC BLOOD PRESSURE: 68 MMHG | SYSTOLIC BLOOD PRESSURE: 118 MMHG | HEIGHT: 67 IN | WEIGHT: 206 LBS | BODY MASS INDEX: 32.33 KG/M2

## 2021-12-03 PROCEDURE — 99396 PREV VISIT EST AGE 40-64: CPT

## 2021-12-03 RX ORDER — MELOXICAM 15 MG/1
15 TABLET ORAL
Qty: 30 | Refills: 1 | Status: DISCONTINUED | COMMUNITY
Start: 2021-06-16 | End: 2021-12-03

## 2021-12-03 RX ORDER — METHYLPHENIDATE HYDROCHLORIDE 10 MG/1
10 TABLET ORAL TWICE DAILY
Qty: 60 | Refills: 0 | Status: DISCONTINUED | COMMUNITY
Start: 2020-11-12 | End: 2021-12-03

## 2021-12-03 RX ORDER — NEOMYCIN/BACITRACIN/POLYMYXINB 3.5-400-5K
OINTMENT (GRAM) TOPICAL
Refills: 0 | Status: DISCONTINUED | COMMUNITY
End: 2021-12-03

## 2021-12-03 RX ORDER — CYCLOBENZAPRINE HYDROCHLORIDE 10 MG/1
10 TABLET, FILM COATED ORAL
Qty: 30 | Refills: 1 | Status: DISCONTINUED | COMMUNITY
Start: 2021-06-16 | End: 2021-12-03

## 2021-12-03 RX ORDER — FOLIC ACID 0.8 MG
500 TABLET ORAL
Refills: 0 | Status: DISCONTINUED | COMMUNITY
End: 2021-12-03

## 2021-12-03 RX ORDER — MULTIVIT-MIN/IRON FUM/FOLIC AC 7.5 MG-4
TABLET ORAL
Refills: 0 | Status: DISCONTINUED | COMMUNITY
End: 2021-12-03

## 2021-12-03 RX ORDER — IBUPROFEN 800 MG/1
800 TABLET, FILM COATED ORAL 3 TIMES DAILY
Qty: 90 | Refills: 0 | Status: DISCONTINUED | COMMUNITY
Start: 2020-11-27 | End: 2021-12-03

## 2021-12-03 NOTE — REVIEW OF SYSTEMS
[Recent Weight Gain (___ Lbs)] : recent [unfilled] ~Ulb weight gain [SOB on Exertion] : shortness of breath on exertion [Abdominal Pain] : abdominal pain [Negative] : Heme/Lymph [FreeTextEntry9] : Joint pain and muscle pain

## 2021-12-03 NOTE — PHYSICAL EXAM
[Appropriately responsive] : appropriately responsive [Alert] : alert [No Acute Distress] : no acute distress [Soft] : soft [Non-tender] : non-tender [Oriented x3] : oriented x3 [Examination Of The Breasts] : a normal appearance [No Masses] : no breast masses were palpable [Labia Majora] : normal [Labia Minora] : normal [Atrophy] : atrophy [Cervical Stenosis] : cervical stenosis [Normal] : normal [Uterine Adnexae] : non-palpable

## 2021-12-03 NOTE — HISTORY OF PRESENT ILLNESS
[Patient reported PAP Smear was normal] : Patient reported PAP Smear was normal [postmenopausal] : postmenopausal [N] : Patient is not sexually active [Patient refuses STI testing] : Patient refuses STI testing [FreeTextEntry1] : 59 yo presents for annual exam and pap smear.  No complaints at this time. [PapSmeardate] : 9/21/20

## 2021-12-04 LAB — HPV HIGH+LOW RISK DNA PNL CVX: NOT DETECTED

## 2021-12-09 LAB — CYTOLOGY CVX/VAG DOC THIN PREP: ABNORMAL

## 2021-12-10 ENCOUNTER — NON-APPOINTMENT (OUTPATIENT)
Age: 61
End: 2021-12-10

## 2021-12-10 ENCOUNTER — APPOINTMENT (OUTPATIENT)
Dept: INTERNAL MEDICINE | Facility: CLINIC | Age: 61
End: 2021-12-10
Payer: MEDICAID

## 2021-12-10 VITALS
WEIGHT: 206 LBS | SYSTOLIC BLOOD PRESSURE: 133 MMHG | DIASTOLIC BLOOD PRESSURE: 80 MMHG | BODY MASS INDEX: 32.33 KG/M2 | RESPIRATION RATE: 17 BRPM | TEMPERATURE: 97.8 F | OXYGEN SATURATION: 95 % | HEIGHT: 67 IN | HEART RATE: 86 BPM

## 2021-12-10 DIAGNOSIS — R10.11 RIGHT UPPER QUADRANT PAIN: ICD-10-CM

## 2021-12-10 DIAGNOSIS — M54.9 DORSALGIA, UNSPECIFIED: ICD-10-CM

## 2021-12-10 DIAGNOSIS — E55.9 VITAMIN D DEFICIENCY, UNSPECIFIED: ICD-10-CM

## 2021-12-10 DIAGNOSIS — F98.8 OTHER SPECIFIED BEHAVIORAL AND EMOTIONAL DISORDERS WITH ONSET USUALLY OCCURRING IN CHILDHOOD AND ADOLESCENCE: ICD-10-CM

## 2021-12-10 DIAGNOSIS — K21.9 GASTRO-ESOPHAGEAL REFLUX DISEASE W/OUT ESOPHAGITIS: ICD-10-CM

## 2021-12-10 DIAGNOSIS — K29.00 ACUTE GASTRITIS W/OUT BLEEDING: ICD-10-CM

## 2021-12-10 PROCEDURE — 99396 PREV VISIT EST AGE 40-64: CPT | Mod: 25

## 2021-12-10 PROCEDURE — G0447 BEHAVIOR COUNSEL OBESITY 15M: CPT

## 2021-12-10 PROCEDURE — 93000 ELECTROCARDIOGRAM COMPLETE: CPT

## 2021-12-12 LAB
25(OH)D3 SERPL-MCNC: 29.2 NG/ML
ALBUMIN SERPL ELPH-MCNC: 4.3 G/DL
ALP BLD-CCNC: 95 U/L
ALT SERPL-CCNC: 22 U/L
ANION GAP SERPL CALC-SCNC: 11 MMOL/L
AST SERPL-CCNC: 18 U/L
BASOPHILS # BLD AUTO: 0.03 K/UL
BASOPHILS NFR BLD AUTO: 0.5 %
BILIRUB SERPL-MCNC: 0.4 MG/DL
BUN SERPL-MCNC: 14 MG/DL
CALCIUM SERPL-MCNC: 9.7 MG/DL
CHLORIDE SERPL-SCNC: 107 MMOL/L
CHOLEST SERPL-MCNC: 198 MG/DL
CO2 SERPL-SCNC: 22 MMOL/L
CREAT SERPL-MCNC: 0.76 MG/DL
EOSINOPHIL # BLD AUTO: 0.11 K/UL
EOSINOPHIL NFR BLD AUTO: 1.8 %
ESTIMATED AVERAGE GLUCOSE: 108 MG/DL
GLUCOSE SERPL-MCNC: 87 MG/DL
HBA1C MFR BLD HPLC: 5.4 %
HCT VFR BLD CALC: 42.6 %
HDLC SERPL-MCNC: 65 MG/DL
HGB BLD-MCNC: 14 G/DL
IMM GRANULOCYTES NFR BLD AUTO: 0.2 %
LDLC SERPL CALC-MCNC: 125 MG/DL
LYMPHOCYTES # BLD AUTO: 2.23 K/UL
LYMPHOCYTES NFR BLD AUTO: 35.7 %
MAN DIFF?: NORMAL
MCHC RBC-ENTMCNC: 30 PG
MCHC RBC-ENTMCNC: 32.9 GM/DL
MCV RBC AUTO: 91.4 FL
MONOCYTES # BLD AUTO: 0.58 K/UL
MONOCYTES NFR BLD AUTO: 9.3 %
NEUTROPHILS # BLD AUTO: 3.29 K/UL
NEUTROPHILS NFR BLD AUTO: 52.5 %
NONHDLC SERPL-MCNC: 134 MG/DL
PLATELET # BLD AUTO: 310 K/UL
POTASSIUM SERPL-SCNC: 4 MMOL/L
PROT SERPL-MCNC: 7.1 G/DL
RBC # BLD: 4.66 M/UL
RBC # FLD: 13.5 %
SODIUM SERPL-SCNC: 141 MMOL/L
TRIGL SERPL-MCNC: 44 MG/DL
TSH SERPL-ACNC: 1.44 UIU/ML
WBC # FLD AUTO: 6.25 K/UL

## 2022-01-02 LAB — SARS-COV-2 N GENE NPH QL NAA+PROBE: NOT DETECTED

## 2022-01-21 ENCOUNTER — APPOINTMENT (OUTPATIENT)
Dept: INTERNAL MEDICINE | Facility: CLINIC | Age: 62
End: 2022-01-21
Payer: MEDICAID

## 2022-01-21 DIAGNOSIS — Z20.822 CONTACT WITH AND (SUSPECTED) EXPOSURE TO COVID-19: ICD-10-CM

## 2022-01-21 PROCEDURE — 99214 OFFICE O/P EST MOD 30 MIN: CPT | Mod: 95

## 2022-01-23 DIAGNOSIS — U07.1 COVID-19: ICD-10-CM

## 2022-01-23 LAB
INFLUENZA A RESULT: NOT DETECTED
INFLUENZA B RESULT: NOT DETECTED
RESP SYN VIRUS RESULT: NOT DETECTED
SARS-COV-2 RESULT: DETECTED

## 2022-01-24 ENCOUNTER — NON-APPOINTMENT (OUTPATIENT)
Age: 62
End: 2022-01-24

## 2022-03-08 ENCOUNTER — APPOINTMENT (OUTPATIENT)
Dept: RHEUMATOLOGY | Facility: CLINIC | Age: 62
End: 2022-03-08

## 2022-03-10 ENCOUNTER — APPOINTMENT (OUTPATIENT)
Dept: INTERNAL MEDICINE | Facility: CLINIC | Age: 62
End: 2022-03-10
Payer: MEDICAID

## 2022-03-10 VITALS
HEIGHT: 67 IN | SYSTOLIC BLOOD PRESSURE: 118 MMHG | OXYGEN SATURATION: 97 % | TEMPERATURE: 97.9 F | WEIGHT: 207 LBS | BODY MASS INDEX: 32.49 KG/M2 | HEART RATE: 83 BPM | DIASTOLIC BLOOD PRESSURE: 79 MMHG | RESPIRATION RATE: 17 BRPM

## 2022-03-10 DIAGNOSIS — S09.90XA UNSPECIFIED INJURY OF HEAD, INITIAL ENCOUNTER: ICD-10-CM

## 2022-03-10 PROCEDURE — 99214 OFFICE O/P EST MOD 30 MIN: CPT

## 2022-03-10 RX ORDER — FLUTICASONE PROPIONATE 50 UG/1
50 SPRAY, METERED NASAL TWICE DAILY
Qty: 1 | Refills: 1 | Status: DISCONTINUED | COMMUNITY
Start: 2022-01-21 | End: 2022-03-10

## 2022-03-10 RX ORDER — BENZONATATE 100 MG/1
100 CAPSULE ORAL 3 TIMES DAILY
Qty: 21 | Refills: 1 | Status: DISCONTINUED | COMMUNITY
Start: 2022-01-21 | End: 2022-03-10

## 2022-03-15 ENCOUNTER — OUTPATIENT (OUTPATIENT)
Dept: OUTPATIENT SERVICES | Facility: HOSPITAL | Age: 62
LOS: 1 days | End: 2022-03-15
Payer: MEDICAID

## 2022-03-15 ENCOUNTER — APPOINTMENT (OUTPATIENT)
Dept: CT IMAGING | Facility: CLINIC | Age: 62
End: 2022-03-15
Payer: MEDICAID

## 2022-03-15 DIAGNOSIS — S09.90XA UNSPECIFIED INJURY OF HEAD, INITIAL ENCOUNTER: ICD-10-CM

## 2022-03-15 PROCEDURE — 70450 CT HEAD/BRAIN W/O DYE: CPT | Mod: 26

## 2022-03-15 PROCEDURE — 70450 CT HEAD/BRAIN W/O DYE: CPT

## 2022-04-22 ENCOUNTER — NON-APPOINTMENT (OUTPATIENT)
Age: 62
End: 2022-04-22

## 2022-06-08 ENCOUNTER — APPOINTMENT (OUTPATIENT)
Dept: RHEUMATOLOGY | Facility: CLINIC | Age: 62
End: 2022-06-08
Payer: MEDICAID

## 2022-06-08 VITALS
WEIGHT: 210 LBS | HEART RATE: 76 BPM | TEMPERATURE: 98 F | BODY MASS INDEX: 32.96 KG/M2 | SYSTOLIC BLOOD PRESSURE: 123 MMHG | HEIGHT: 67 IN | DIASTOLIC BLOOD PRESSURE: 84 MMHG | OXYGEN SATURATION: 98 %

## 2022-06-08 DIAGNOSIS — M54.6 PAIN IN THORACIC SPINE: ICD-10-CM

## 2022-06-08 PROCEDURE — 99214 OFFICE O/P EST MOD 30 MIN: CPT

## 2022-06-09 PROBLEM — M54.6 THORACIC BACK PAIN: Status: ACTIVE | Noted: 2021-06-16

## 2022-06-09 NOTE — PHYSICAL EXAM
[General Appearance - Alert] : alert [General Appearance - In No Acute Distress] : in no acute distress [Neck Cervical Mass (___cm)] : no neck mass was observed [Neck Appearance] : the appearance of the neck was normal [Jugular Venous Distention Increased] : there was no jugular-venous distention [Thyroid Diffuse Enlargement] : the thyroid was not enlarged [Thyroid Nodule] : there were no palpable thyroid nodules [Auscultation Breath Sounds / Voice Sounds] : lungs were clear to auscultation bilaterally [Heart Rate And Rhythm] : heart rate was normal and rhythm regular [Heart Sounds] : normal S1 and S2 [Heart Sounds Gallop] : no gallops [Murmurs] : no murmurs [Heart Sounds Pericardial Friction Rub] : no pericardial rub [Full Pulse] : the pedal pulses are present [Edema] : there was no peripheral edema [No CVA Tenderness] : no ~M costovertebral angle tenderness [No Spinal Tenderness] : no spinal tenderness [Skin Color & Pigmentation] : normal skin color and pigmentation [Skin Turgor] : normal skin turgor [] : no rash [Oriented To Time, Place, And Person] : oriented to person, place, and time [Affect] : the affect was normal [Impaired Insight] : insight and judgment were intact [FreeTextEntry1] : shoulder ongoing pain worse with movement - full ROM -   synovitis over the right shoulder

## 2022-06-09 NOTE — ASSESSMENT
[FreeTextEntry1] : 59 year-old female with bilateral symmetrical joint pain/stiffness\par \par \par \par 1. Thoracic and cervical DDD - no spinal stenosis - but  ongoing right side thoracic pain - maybe related to complete tear of the right shoulder -never had emg - ongoing shoulder pain with guarding\par 2. RC tear - complete - needs surgery - has postpone due to pandemic - prednisone 10 mg daily X 1 month, to help with inflammation and pain - cannot have surgery at this time - has a new job!\par \par \par I reviewed previous labs results with patients.\par Diagnosis and Prognosis discussed\par Continue with current medications\par medications refilled\par education provided on prednisone \par F/u 2 months\par \par

## 2022-06-09 NOTE — HISTORY OF PRESENT ILLNESS
[___ Month(s) Ago] : [unfilled] month(s) ago [Arthralgias] : arthralgias [FreeTextEntry1] : ongoing shoulder pain radiating to the right arm\par still have not had surgery over the right shoulder\par still with severe pain over the posterior rib cage radiating to the front\par on nortriptyline by neuro with mild improvement on her  symptoms. \par

## 2022-06-20 ENCOUNTER — APPOINTMENT (OUTPATIENT)
Dept: MRI IMAGING | Facility: CLINIC | Age: 62
End: 2022-06-20
Payer: MEDICAID

## 2022-06-20 ENCOUNTER — OUTPATIENT (OUTPATIENT)
Dept: OUTPATIENT SERVICES | Facility: HOSPITAL | Age: 62
LOS: 1 days | End: 2022-06-20
Payer: MEDICAID

## 2022-06-20 ENCOUNTER — RESULT REVIEW (OUTPATIENT)
Age: 62
End: 2022-06-20

## 2022-06-20 DIAGNOSIS — Z00.8 ENCOUNTER FOR OTHER GENERAL EXAMINATION: ICD-10-CM

## 2022-06-20 PROCEDURE — 70551 MRI BRAIN STEM W/O DYE: CPT

## 2022-06-20 PROCEDURE — 70551 MRI BRAIN STEM W/O DYE: CPT | Mod: 26

## 2022-07-08 ENCOUNTER — APPOINTMENT (OUTPATIENT)
Dept: MRI IMAGING | Facility: CLINIC | Age: 62
End: 2022-07-08

## 2022-08-06 ENCOUNTER — RX RENEWAL (OUTPATIENT)
Age: 62
End: 2022-08-06

## 2022-09-14 ENCOUNTER — EMERGENCY (EMERGENCY)
Facility: HOSPITAL | Age: 62
LOS: 1 days | Discharge: HOME | End: 2022-09-14
Attending: EMERGENCY MEDICINE
Payer: MEDICAID

## 2022-09-14 VITALS
OXYGEN SATURATION: 97 % | RESPIRATION RATE: 18 BRPM | SYSTOLIC BLOOD PRESSURE: 159 MMHG | DIASTOLIC BLOOD PRESSURE: 80 MMHG | HEART RATE: 80 BPM | TEMPERATURE: 99 F

## 2022-09-14 VITALS
TEMPERATURE: 99 F | HEART RATE: 79 BPM | SYSTOLIC BLOOD PRESSURE: 163 MMHG | HEIGHT: 67 IN | DIASTOLIC BLOOD PRESSURE: 82 MMHG | WEIGHT: 210.1 LBS | RESPIRATION RATE: 18 BRPM | OXYGEN SATURATION: 97 %

## 2022-09-14 LAB
ALBUMIN SERPL ELPH-MCNC: 3.9 G/DL — SIGNIFICANT CHANGE UP (ref 3.3–5)
ALP SERPL-CCNC: 83 U/L — SIGNIFICANT CHANGE UP (ref 40–120)
ALT FLD-CCNC: 21 U/L — SIGNIFICANT CHANGE UP (ref 10–45)
ANION GAP SERPL CALC-SCNC: 12 MMOL/L — SIGNIFICANT CHANGE UP (ref 5–17)
APPEARANCE UR: CLEAR — SIGNIFICANT CHANGE UP
AST SERPL-CCNC: 15 U/L — SIGNIFICANT CHANGE UP (ref 10–40)
BACTERIA # UR AUTO: NEGATIVE — SIGNIFICANT CHANGE UP
BASOPHILS # BLD AUTO: 0.03 K/UL — SIGNIFICANT CHANGE UP (ref 0–0.2)
BASOPHILS NFR BLD AUTO: 0.3 % — SIGNIFICANT CHANGE UP (ref 0–2)
BILIRUB SERPL-MCNC: 0.4 MG/DL — SIGNIFICANT CHANGE UP (ref 0.2–1.2)
BILIRUB UR-MCNC: NEGATIVE — SIGNIFICANT CHANGE UP
BUN SERPL-MCNC: 13 MG/DL — SIGNIFICANT CHANGE UP (ref 7–23)
CALCIUM SERPL-MCNC: 9.5 MG/DL — SIGNIFICANT CHANGE UP (ref 8.4–10.5)
CHLORIDE SERPL-SCNC: 107 MMOL/L — SIGNIFICANT CHANGE UP (ref 96–108)
CO2 SERPL-SCNC: 22 MMOL/L — SIGNIFICANT CHANGE UP (ref 22–31)
COLOR SPEC: SIGNIFICANT CHANGE UP
CREAT SERPL-MCNC: 0.74 MG/DL — SIGNIFICANT CHANGE UP (ref 0.5–1.3)
DIFF PNL FLD: NEGATIVE — SIGNIFICANT CHANGE UP
EGFR: 93 ML/MIN/1.73M2 — SIGNIFICANT CHANGE UP
EOSINOPHIL # BLD AUTO: 0.07 K/UL — SIGNIFICANT CHANGE UP (ref 0–0.5)
EOSINOPHIL NFR BLD AUTO: 0.8 % — SIGNIFICANT CHANGE UP (ref 0–6)
EPI CELLS # UR: 3 /HPF — SIGNIFICANT CHANGE UP
GLUCOSE SERPL-MCNC: 92 MG/DL — SIGNIFICANT CHANGE UP (ref 70–99)
GLUCOSE UR QL: NEGATIVE — SIGNIFICANT CHANGE UP
HCT VFR BLD CALC: 38.2 % — SIGNIFICANT CHANGE UP (ref 34.5–45)
HGB BLD-MCNC: 12.7 G/DL — SIGNIFICANT CHANGE UP (ref 11.5–15.5)
HYALINE CASTS # UR AUTO: 10 /LPF — HIGH (ref 0–2)
IMM GRANULOCYTES NFR BLD AUTO: 0.3 % — SIGNIFICANT CHANGE UP (ref 0–1.5)
KETONES UR-MCNC: NEGATIVE — SIGNIFICANT CHANGE UP
LEUKOCYTE ESTERASE UR-ACNC: NEGATIVE — SIGNIFICANT CHANGE UP
LIDOCAIN IGE QN: 27 U/L — SIGNIFICANT CHANGE UP (ref 7–60)
LYMPHOCYTES # BLD AUTO: 3.34 K/UL — HIGH (ref 1–3.3)
LYMPHOCYTES # BLD AUTO: 38.6 % — SIGNIFICANT CHANGE UP (ref 13–44)
MCHC RBC-ENTMCNC: 29.7 PG — SIGNIFICANT CHANGE UP (ref 27–34)
MCHC RBC-ENTMCNC: 33.2 GM/DL — SIGNIFICANT CHANGE UP (ref 32–36)
MCV RBC AUTO: 89.3 FL — SIGNIFICANT CHANGE UP (ref 80–100)
MONOCYTES # BLD AUTO: 0.9 K/UL — SIGNIFICANT CHANGE UP (ref 0–0.9)
MONOCYTES NFR BLD AUTO: 10.4 % — SIGNIFICANT CHANGE UP (ref 2–14)
NEUTROPHILS # BLD AUTO: 4.28 K/UL — SIGNIFICANT CHANGE UP (ref 1.8–7.4)
NEUTROPHILS NFR BLD AUTO: 49.6 % — SIGNIFICANT CHANGE UP (ref 43–77)
NITRITE UR-MCNC: NEGATIVE — SIGNIFICANT CHANGE UP
NRBC # BLD: 0 /100 WBCS — SIGNIFICANT CHANGE UP (ref 0–0)
NT-PROBNP SERPL-SCNC: 14 PG/ML — SIGNIFICANT CHANGE UP (ref 0–300)
PH UR: 6.5 — SIGNIFICANT CHANGE UP (ref 5–8)
PLATELET # BLD AUTO: 277 K/UL — SIGNIFICANT CHANGE UP (ref 150–400)
POTASSIUM SERPL-MCNC: 3.4 MMOL/L — LOW (ref 3.5–5.3)
POTASSIUM SERPL-SCNC: 3.4 MMOL/L — LOW (ref 3.5–5.3)
PROT SERPL-MCNC: 7 G/DL — SIGNIFICANT CHANGE UP (ref 6–8.3)
PROT UR-MCNC: ABNORMAL
RBC # BLD: 4.28 M/UL — SIGNIFICANT CHANGE UP (ref 3.8–5.2)
RBC # FLD: 13.5 % — SIGNIFICANT CHANGE UP (ref 10.3–14.5)
RBC CASTS # UR COMP ASSIST: 1 /HPF — SIGNIFICANT CHANGE UP (ref 0–4)
SARS-COV-2 RNA SPEC QL NAA+PROBE: SIGNIFICANT CHANGE UP
SODIUM SERPL-SCNC: 141 MMOL/L — SIGNIFICANT CHANGE UP (ref 135–145)
SP GR SPEC: >1.05 (ref 1.01–1.02)
TROPONIN T, HIGH SENSITIVITY RESULT: <6 NG/L — SIGNIFICANT CHANGE UP (ref 0–51)
UROBILINOGEN FLD QL: NEGATIVE — SIGNIFICANT CHANGE UP
WBC # BLD: 8.65 K/UL — SIGNIFICANT CHANGE UP (ref 3.8–10.5)
WBC # FLD AUTO: 8.65 K/UL — SIGNIFICANT CHANGE UP (ref 3.8–10.5)
WBC UR QL: 51 /HPF — HIGH (ref 0–5)

## 2022-09-14 PROCEDURE — 99285 EMERGENCY DEPT VISIT HI MDM: CPT

## 2022-09-14 PROCEDURE — 76705 ECHO EXAM OF ABDOMEN: CPT | Mod: 26

## 2022-09-14 PROCEDURE — 84484 ASSAY OF TROPONIN QUANT: CPT

## 2022-09-14 PROCEDURE — 76705 ECHO EXAM OF ABDOMEN: CPT

## 2022-09-14 PROCEDURE — 71275 CT ANGIOGRAPHY CHEST: CPT | Mod: MA

## 2022-09-14 PROCEDURE — 80053 COMPREHEN METABOLIC PANEL: CPT

## 2022-09-14 PROCEDURE — 74174 CTA ABD&PLVS W/CONTRAST: CPT | Mod: 26,MA

## 2022-09-14 PROCEDURE — 85025 COMPLETE CBC W/AUTO DIFF WBC: CPT

## 2022-09-14 PROCEDURE — 36415 COLL VENOUS BLD VENIPUNCTURE: CPT

## 2022-09-14 PROCEDURE — 83880 ASSAY OF NATRIURETIC PEPTIDE: CPT

## 2022-09-14 PROCEDURE — 83690 ASSAY OF LIPASE: CPT

## 2022-09-14 PROCEDURE — 96374 THER/PROPH/DIAG INJ IV PUSH: CPT | Mod: XU

## 2022-09-14 PROCEDURE — 93005 ELECTROCARDIOGRAM TRACING: CPT | Mod: 76

## 2022-09-14 PROCEDURE — 81001 URINALYSIS AUTO W/SCOPE: CPT

## 2022-09-14 PROCEDURE — U0003: CPT

## 2022-09-14 PROCEDURE — 96375 TX/PRO/DX INJ NEW DRUG ADDON: CPT | Mod: XU

## 2022-09-14 PROCEDURE — 74174 CTA ABD&PLVS W/CONTRAST: CPT | Mod: MA

## 2022-09-14 PROCEDURE — 87086 URINE CULTURE/COLONY COUNT: CPT

## 2022-09-14 PROCEDURE — U0005: CPT

## 2022-09-14 PROCEDURE — 71275 CT ANGIOGRAPHY CHEST: CPT | Mod: 26,MA

## 2022-09-14 PROCEDURE — 99285 EMERGENCY DEPT VISIT HI MDM: CPT | Mod: 25

## 2022-09-14 RX ORDER — MORPHINE SULFATE 50 MG/1
4 CAPSULE, EXTENDED RELEASE ORAL ONCE
Refills: 0 | Status: DISCONTINUED | OUTPATIENT
Start: 2022-09-14 | End: 2022-09-14

## 2022-09-14 RX ORDER — KETOROLAC TROMETHAMINE 30 MG/ML
15 SYRINGE (ML) INJECTION ONCE
Refills: 0 | Status: DISCONTINUED | OUTPATIENT
Start: 2022-09-14 | End: 2022-09-14

## 2022-09-14 RX ORDER — POTASSIUM CHLORIDE 20 MEQ
40 PACKET (EA) ORAL ONCE
Refills: 0 | Status: COMPLETED | OUTPATIENT
Start: 2022-09-14 | End: 2022-09-14

## 2022-09-14 RX ORDER — CYCLOBENZAPRINE HYDROCHLORIDE 10 MG/1
1 TABLET, FILM COATED ORAL
Qty: 9 | Refills: 0
Start: 2022-09-14 | End: 2022-09-16

## 2022-09-14 RX ADMIN — MORPHINE SULFATE 4 MILLIGRAM(S): 50 CAPSULE, EXTENDED RELEASE ORAL at 04:41

## 2022-09-14 RX ADMIN — Medication 15 MILLIGRAM(S): at 06:36

## 2022-09-14 RX ADMIN — Medication 40 MILLIEQUIVALENT(S): at 08:51

## 2022-09-14 RX ADMIN — MORPHINE SULFATE 4 MILLIGRAM(S): 50 CAPSULE, EXTENDED RELEASE ORAL at 07:39

## 2022-09-14 RX ADMIN — Medication 15 MILLIGRAM(S): at 07:39

## 2022-09-14 NOTE — ED PROVIDER NOTE - NSFOLLOWUPCLINICS_GEN_ALL_ED_FT
[facility]  Orthopedic Surgery  [address] [address]  Phone: [phone]  Fax:
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
## 2022-09-14 NOTE — ED PROVIDER NOTE - PATIENT PORTAL LINK FT
You can access the FollowMyHealth Patient Portal offered by [facility] by registering at the following website: [email or web address]. By joining [facility]’s FollowMyHealth portal, you will also be able to view your health information using other applications (apps) compatible with our system.

## 2022-09-14 NOTE — ED PROVIDER NOTE - CLINICAL SUMMARY MEDICAL DECISION MAKING FREE TEXT BOX
61 F with history of spinal cord cysts presents to ED with Rt sided back and torso pain that has worsened in past week but chronic for past 2 years. history of spinal surgery for cyst removal. vs wnl. PE remarkable for c/t spine tenderness and Rt sided chest wall tenderness. Possible MSK/neuropathic pain from previous spinal surgery v ACS v gallbladder v dissection v early zoster reactivation (but no rash). Will get CTa chest ab pelv, ACS labs, EKG. Give morphine for pain. Reevaluate.

## 2022-09-14 NOTE — ED PROVIDER NOTE - OBJECTIVE STATEMENT
61 F with history of spinal cord cysts presents to ED with Rt sided back and torso pain. 61 F with history of spinal cord cysts presents to ED with Rt sided back and torso pain. States the pain has been present for 2+ years but has worsened in the past 1-2 weeks. Pain is worse with movement. Takes advil and prednisone for pain with minimal relief. Pt had spinal surgery for cyst removal 3 years ago and since then has had the pain. Denies any inciting event for worsening of pain. Pain not worse with eating. Denies fever, chest pain, SOB, abdominal pain, N/V/D, dysuria, rash.

## 2022-09-14 NOTE — ED PROVIDER NOTE - NSFOLLOWUPINSTRUCTIONS_ED_ALL_ED_FT
1.  Stay well hydrated  2.  Take Ibuprofen 600mgs every 6-8 hrs as needed for pain  3.  Take Flexeril 5mgs every 8 hrs as needed for severe pain/spasm  4.  Follow up with your PMD in 2-3 days.  Bring a copy of your results with you to your appointment  5.  Follow up with orthopedics as needed for persistent symptoms  6.  Return to the ER for worsening pain, rash, fevers or any other concerning symptoms

## 2022-09-14 NOTE — ED PROVIDER NOTE - ATTENDING CONTRIBUTION TO CARE
MD Bhandari:  patient seen and evaluated personally.   I agree with the History & Physical,  Impression & Plan other than what was detailed in my note.  MD Bhandari  61 F with history of spinal cord cysts presents to ED with Rt sided back and torso pain. has been going on intermittently over past two years but states recently has gotten worse over past few weeks, does not think it has to do with eating, can be severe, waxing and waning, no rash noted, no rash on exam to suggest shingles, mild ruq ttp, mild r flank ttp, plan for cbc, cmp, ua, ct scan, re eval.

## 2022-09-14 NOTE — ED PROVIDER NOTE - PROGRESS NOTE DETAILS
Adelfo Chapin MD, PGY1  CT angio negative for dissection. trop negative. acs workup negative thus far. Will order RUQ US to look for gallbladder pathology.  Pt still having pain despite morphine. Will give 15mg ketorolac. Received sign out pending RUQ US.  Patient seen at bedside in NAD.  VSS.  Patient resting comfortably.  Patient endorsing some right back pain, but improved from initial presentation.  RUQ US unremarkable.  No rash.  Offered muscle relaxant, but patient endorsing she drove to the ER.  Will send Rx for muscle relaxant.  Given strict follow up and return precautions.  -Alonzo Verdin PA-C

## 2022-09-14 NOTE — ED PROVIDER NOTE - NS ED ROS FT
Gen: Denies fevers  CV: Denies chest pain  Skin: denies color changes  Resp: Denies SOB, cough  Endo: Denies increased urination  GI: Denies nausea, vomiting  Msk: +back pain  : Denies dysuria  Neuro: Denies LOC  Psych: Denies mood changes  all other ROS negative unless indicated in HPI

## 2022-09-14 NOTE — ED PROVIDER NOTE - PHYSICAL EXAMINATION
Gen: WDWN, NAD  HEENT: EOMI, no nasal discharge, mucous membranes moist  CV: RRR, +S1/S2, no M/R/G, 2+ radial pulses b/l  Resp: CTAB, no W/R/R, no accessory muscle use, no increased work of breathing  GI: Abdomen soft non-distended, NTTP  MSK: + midline c and t spine tenderness over sites of previous surgery. Rt Paraspinal and chest wall TTP. Full ROM of spine. Pt is able to ambulate.   Neuro: A&Ox4, following commands, moving all four extremities spontaneously  Psych: appropriate mood  Skin: No rash

## 2022-09-15 LAB
CULTURE RESULTS: SIGNIFICANT CHANGE UP
SPECIMEN SOURCE: SIGNIFICANT CHANGE UP

## 2022-09-16 RX ORDER — NITROFURANTOIN MACROCRYSTAL 50 MG
1 CAPSULE ORAL
Qty: 10 | Refills: 0
Start: 2022-09-16 | End: 2022-09-20

## 2022-09-16 NOTE — ED POST DISCHARGE NOTE - DETAILS
9/16 Zoltan Gay PA-C: Results d/w pt. Back pain improved. When questioned endorsed increased urinary frequency but no other uti symptoms. No dysuria, fc or hx of frequent uti. Likely asymptotic bacteruria. Rx for macrobid sent to confirm pt pharmacy if more pronounced uti arise. Appreciative of call.

## 2022-09-19 NOTE — ASSESSMENT
EASTON Mooney is a 29 y.o. female who was seen  for management of hyperthyroidism  PCP :  Ana Maria Hutchins DO    Seen as new patient    Has seen Dr. Chano Rojas      Patient has a PMH of syncope, cardiac arrest, seizure    She was at gym and passed out after playing basketball , was found to be in cardiac arrest    Was admitted to [place] [address]. Was found to have elevated thyroid hormones. No Weight loss  No Fatigue, Tremors, Palpitations  No Heat intolerance    No Eye changes    Patient was found to have a low TSH ( < 0.01) and high FT4 (2.4) and high free T3 6.2 on labs done in       Thyroid uptake and scan showed high uptake of 65 %     On methimazole 5 mg daily since     Mild, controlled     TSH 2.54    No worsening factors    FH: Mom  from scleroderma    Past Medical History:   Diagnosis Date    Cardiac arrest ([facility] [address]     Seizures ([facility] [address]      Past Surgical History:   Procedure Laterality Date    ANKLE SURGERY Right     KNEE SURGERY Left      Current Outpatient Medications   Medication Sig Dispense Refill    MAGnesium-Oxide 400 (240 Mg) MG tablet Take 1 tablet by mouth 2 times daily 60 tablet 5    methIMAzole (TAPAZOLE) 5 MG tablet Take 1 tablet by mouth in the morning. 30 tablet 1    quiNIDine gluconate 324 MG extended release tablet Take 1 tablet by mouth in the morning. 90 tablet 3    levETIRAcetam (KEPPRA) 500 MG tablet Take 1 tablet by mouth 2 times daily 60 tablet 2     No current facility-administered medications for this visit. FH of thyroid disease : No      Nuclear radioiodine thyroid uptake and scintigraphy on 3/30/2020       Clinical history: Subclinical hyperthyroidism. Comparisons: None. PROCEDURE: Patient was given an oral dose of 270 uCi of I-123 on 3/30/2020. Patient returned for 24-hour imaging on 3/31/2020. Standard pinhole gamma camera scintigraphy and radioiodine uptake calculation performed.        FINDINGS:       The scintigrams demonstrate no focal hot or [FreeTextEntry1] : Impression\par \par Gastroesophageal reflux disease\par \par Mild gastritis on upper endoscopy with negative biopsies for H. pylori\par \par Colonoscopy with polyp removed and due for colonoscopy in 5 years\par \par Unrelenting pain originating from her right thoracic spine and anteriorly radiation\par \par Suggest\par \par Continue antireflux diet\par \par She is reminded to repeat colonoscopy in 5 years\par \par Second opinion orthopedic spine surgeon at the patient's request\par \par Follow-up with me in 6 months or sooner\par \par Call or return anytime\par \par  cold nodular defects. Homogeneous uptake throughout the right and left lobes of the thyroid. Thyroid gland does not appear significantly enlarged. Radioiodine 24-hour uptake is calculated at 65.2%, significantly increased above normal range which is generally considered 10-35%. Impression   1. No focal hot or cold defects are detected on thyroid scintigraphy. 2. Significantly increased radioiodine 24 hour uptake of 65.2%. In the clinical setting of hyperthyroidism, these findings are most compatible with Graves' disease. ROS: Scanned, reviewed    Vitals:    09/19/22 0912   BP: 112/68   Pulse: 71   SpO2: 99%       Constitutional: Well-developed, appears stated age, cooperative, in no acute distress  H/E/N/M/T:atraumatic, normocephalic, external ears, nose, lips normal without lesions  Eyes: Lids, lashes, conjunctivae and sclerae normal, No proptosis, no redness  Neck: supple, symmetrical, no swelling  Skin: No obvious rashes or lesions present. Skin and hair texture normal  Psychiatric: Judgement and Insight:  judgement and insight appear normal  Neuro: Normal without focal findings, speech is normal normal, speech is spontaneous  Chest: No labored breathing, no chest deformity, no stridor  Musculoskeletal: No joint deformity, swelling  Thyroid: Enlarged    Assessment/Plan    1. Hyperthyroidism     Eve Navarro is a 29 y.o. female was found to have a low TSH and high FT4 consistent with hyperthyroidism     Clinically euthyroid. Thyrogen receptor antibodies negative     Thyroid uptake and scan showed diffuse increased uptake consistent with Graves disease. Repeat labs in 04/20 again showed high T3 and T4    On methimazole 5 mg daily since 04/20    Tolerating well. LFTs normal on 6/22    Recheck    2. Seizure. Off keppra  As per neurologist    3. Cardiac arrest  Follows with cardiology. She is being worked up. 4. Rash. Managed by PCP.       5.Goiter: No symptoms, monitor

## 2022-09-22 ENCOUNTER — APPOINTMENT (OUTPATIENT)
Dept: MRI IMAGING | Facility: CLINIC | Age: 62
End: 2022-09-22

## 2022-09-24 ENCOUNTER — APPOINTMENT (OUTPATIENT)
Dept: RADIOLOGY | Facility: CLINIC | Age: 62
End: 2022-09-24

## 2022-09-24 ENCOUNTER — OUTPATIENT (OUTPATIENT)
Dept: OUTPATIENT SERVICES | Facility: HOSPITAL | Age: 62
LOS: 1 days | End: 2022-09-24
Payer: MEDICAID

## 2022-09-24 DIAGNOSIS — M67.911 UNSPECIFIED DISORDER OF SYNOVIUM AND TENDON, RIGHT SHOULDER: ICD-10-CM

## 2022-09-24 PROCEDURE — 73030 X-RAY EXAM OF SHOULDER: CPT | Mod: 26,LT,RT

## 2022-09-24 PROCEDURE — 73030 X-RAY EXAM OF SHOULDER: CPT

## 2022-10-12 ENCOUNTER — APPOINTMENT (OUTPATIENT)
Dept: MRI IMAGING | Facility: CLINIC | Age: 62
End: 2022-10-12

## 2022-10-12 ENCOUNTER — OUTPATIENT (OUTPATIENT)
Dept: OUTPATIENT SERVICES | Facility: HOSPITAL | Age: 62
LOS: 1 days | End: 2022-10-12
Payer: MEDICAID

## 2022-10-12 DIAGNOSIS — Z00.8 ENCOUNTER FOR OTHER GENERAL EXAMINATION: ICD-10-CM

## 2022-10-12 PROCEDURE — 73221 MRI JOINT UPR EXTREM W/O DYE: CPT | Mod: 26,RT

## 2022-10-12 PROCEDURE — 73221 MRI JOINT UPR EXTREM W/O DYE: CPT

## 2022-10-18 RX ORDER — PREDNISONE 10 MG/1
10 TABLET ORAL
Qty: 30 | Refills: 1 | Status: DISCONTINUED | COMMUNITY
Start: 2022-06-08 | End: 2022-10-18

## 2022-10-27 ENCOUNTER — NON-APPOINTMENT (OUTPATIENT)
Age: 62
End: 2022-10-27

## 2022-10-31 ENCOUNTER — APPOINTMENT (OUTPATIENT)
Dept: ORTHOPEDIC SURGERY | Facility: CLINIC | Age: 62
End: 2022-10-31

## 2022-10-31 VITALS
BODY MASS INDEX: 32.96 KG/M2 | SYSTOLIC BLOOD PRESSURE: 130 MMHG | DIASTOLIC BLOOD PRESSURE: 79 MMHG | HEIGHT: 67 IN | WEIGHT: 210 LBS

## 2022-10-31 PROCEDURE — 99213 OFFICE O/P EST LOW 20 MIN: CPT

## 2022-11-26 ENCOUNTER — APPOINTMENT (OUTPATIENT)
Dept: MAMMOGRAPHY | Facility: CLINIC | Age: 62
End: 2022-11-26

## 2022-11-26 ENCOUNTER — OUTPATIENT (OUTPATIENT)
Dept: OUTPATIENT SERVICES | Facility: HOSPITAL | Age: 62
LOS: 1 days | End: 2022-11-26
Payer: MEDICAID

## 2022-11-26 ENCOUNTER — RESULT REVIEW (OUTPATIENT)
Age: 62
End: 2022-11-26

## 2022-11-26 DIAGNOSIS — Z00.8 ENCOUNTER FOR OTHER GENERAL EXAMINATION: ICD-10-CM

## 2022-11-26 PROCEDURE — 77063 BREAST TOMOSYNTHESIS BI: CPT

## 2022-11-26 PROCEDURE — 77067 SCR MAMMO BI INCL CAD: CPT | Mod: 26

## 2022-11-26 PROCEDURE — 77063 BREAST TOMOSYNTHESIS BI: CPT | Mod: 26

## 2022-11-26 PROCEDURE — 77067 SCR MAMMO BI INCL CAD: CPT

## 2022-12-09 ENCOUNTER — APPOINTMENT (OUTPATIENT)
Dept: OBGYN | Facility: CLINIC | Age: 62
End: 2022-12-09

## 2022-12-09 VITALS
DIASTOLIC BLOOD PRESSURE: 70 MMHG | WEIGHT: 197 LBS | SYSTOLIC BLOOD PRESSURE: 122 MMHG | BODY MASS INDEX: 30.92 KG/M2 | HEIGHT: 67 IN

## 2022-12-09 DIAGNOSIS — Z01.419 ENCOUNTER FOR GYNECOLOGICAL EXAMINATION (GENERAL) (ROUTINE) W/OUT ABNORMAL FINDINGS: ICD-10-CM

## 2022-12-09 PROCEDURE — 99396 PREV VISIT EST AGE 40-64: CPT

## 2022-12-09 NOTE — HISTORY OF PRESENT ILLNESS
[FreeTextEntry1] : Check up.  No cramping or bleeding. Did mammo/sono last month.  Colonoscopy 2020.  Weight stable.

## 2022-12-09 NOTE — REVIEW OF SYSTEMS
[Cough] : cough [SOB on Exertion] : shortness of breath on exertion [Wheezing] : wheezing [Constipation] : constipation [Heartburn] : heartburn [Bloating] : bloating [Arthralgias] : arthralgias [Myalgias] : myalgias [Back Pain] : back pain [Muscle Weakness] : muscle weakness [Negative] : Heme/Lymph

## 2022-12-12 LAB — HPV HIGH+LOW RISK DNA PNL CVX: NOT DETECTED

## 2022-12-23 LAB — CYTOLOGY CVX/VAG DOC THIN PREP: ABNORMAL

## 2023-01-09 ENCOUNTER — APPOINTMENT (OUTPATIENT)
Dept: ORTHOPEDIC SURGERY | Facility: CLINIC | Age: 63
End: 2023-01-09

## 2023-02-01 ENCOUNTER — RX RENEWAL (OUTPATIENT)
Age: 63
End: 2023-02-01

## 2023-02-15 ENCOUNTER — NON-APPOINTMENT (OUTPATIENT)
Age: 63
End: 2023-02-15

## 2023-02-16 ENCOUNTER — APPOINTMENT (OUTPATIENT)
Dept: ORTHOPEDIC SURGERY | Facility: CLINIC | Age: 63
End: 2023-02-16
Payer: MEDICAID

## 2023-02-16 ENCOUNTER — APPOINTMENT (OUTPATIENT)
Dept: INTERNAL MEDICINE | Facility: CLINIC | Age: 63
End: 2023-02-16

## 2023-02-16 VITALS
OXYGEN SATURATION: 99 % | TEMPERATURE: 97.3 F | RESPIRATION RATE: 17 BRPM | HEART RATE: 75 BPM | SYSTOLIC BLOOD PRESSURE: 130 MMHG | HEIGHT: 67 IN | BODY MASS INDEX: 34.53 KG/M2 | WEIGHT: 220 LBS | DIASTOLIC BLOOD PRESSURE: 84 MMHG

## 2023-02-16 DIAGNOSIS — M54.41 LUMBAGO WITH SCIATICA, LEFT SIDE: ICD-10-CM

## 2023-02-16 DIAGNOSIS — G93.0 CEREBRAL CYSTS: ICD-10-CM

## 2023-02-16 DIAGNOSIS — M54.42 LUMBAGO WITH SCIATICA, LEFT SIDE: ICD-10-CM

## 2023-02-16 DIAGNOSIS — M54.50 LOW BACK PAIN, UNSPECIFIED: ICD-10-CM

## 2023-02-16 PROCEDURE — 99214 OFFICE O/P EST MOD 30 MIN: CPT

## 2023-02-16 PROCEDURE — 72072 X-RAY EXAM THORAC SPINE 3VWS: CPT

## 2023-02-16 PROCEDURE — 72100 X-RAY EXAM L-S SPINE 2/3 VWS: CPT

## 2023-02-17 PROBLEM — M54.50 LOWER BACK PAIN: Status: ACTIVE | Noted: 2020-08-18

## 2023-02-17 PROBLEM — M54.42 ACUTE LOW BACK PAIN WITH BILATERAL SCIATICA: Status: ACTIVE | Noted: 2023-02-17

## 2023-02-17 NOTE — DISCUSSION/SUMMARY
[de-identified] : 62-year-old female with 1 week of acute low back pain and intermittent radiculopathy to the bilateral lower extremities.  Recommended a course of anti-inflammatories, home stretching, rest, ice, heat, physical therapy.  She will be seeing a neurosurgeon in the next 2 weeks to check on her thoracic spine and she will have the lumbar spine evaluated at that time as well.  She may continue naproxen as prescribed by her primary doctor.  Follow-up as needed.

## 2023-02-17 NOTE — HISTORY OF PRESENT ILLNESS
[de-identified] : 62-year-old female presents with 1 week of severe low back pain radiating to the bilateral legs.  She has a history of low back pain that flares approximately once yearly and is usually less severe than the current episode.  Current pain is in the midline low back, worse with sitting and walking, intermittent radiation distally.  Denies numbness of the bilateral legs and feet, however reports mild right knee weakness.  No recent injury or traumatic event.  Denies gait instability, numbness or tingling, changes in bowel or bladder function.  She has been taking Advil intermittently for the past week.  She has taken prednisone in the past due to a rotator cuff tear.  History of thoracic laminectomy with neurosurgery due to arachnoid cyst.  Also takes gabapentin for general pain.\manoj Works at a law firm.

## 2023-02-17 NOTE — PHYSICAL EXAM
[de-identified] : General: No acute distress\par Mental: Alert and oriented x3\par Eyes: Conjunctivitis not seen\par Chest: Symmetric chest rise, no audible wheezing\par Skin: Bilateral lower extremities absent from rashes and ulcers\par Abdomen: No distention\par \par Spine: Well-healed surgical scar at the midline thoracic spine.  Nontender thoracic spine.  Moderate to severe tenderness lumbar spine in the midline.  Mild tender paraspinal muscles.\par 5/5 strength throughout the bilateral lower extremities.\par 2/2 sensation throughout the bilateral lower extremities\par Positive straight leg raise on the left.  Negative straight leg raise on the right. [de-identified] : Lumbar spine x-rays demonstrate neutral alignment, well-maintained disc space height, mild spondylosis lower lumbar spine.  Thoracic spine shows neutral alignment, no vertebral compression or other injuries.\par \par MRI lumbar and thoracic spine from 2020 reviewed.

## 2023-03-09 ENCOUNTER — LABORATORY RESULT (OUTPATIENT)
Age: 63
End: 2023-03-09

## 2023-03-09 ENCOUNTER — NON-APPOINTMENT (OUTPATIENT)
Age: 63
End: 2023-03-09

## 2023-03-09 ENCOUNTER — APPOINTMENT (OUTPATIENT)
Dept: INTERNAL MEDICINE | Facility: CLINIC | Age: 63
End: 2023-03-09
Payer: MEDICAID

## 2023-03-09 VITALS
HEIGHT: 67 IN | OXYGEN SATURATION: 97 % | WEIGHT: 220 LBS | DIASTOLIC BLOOD PRESSURE: 84 MMHG | TEMPERATURE: 97.7 F | HEART RATE: 78 BPM | SYSTOLIC BLOOD PRESSURE: 137 MMHG | RESPIRATION RATE: 17 BRPM | BODY MASS INDEX: 34.53 KG/M2

## 2023-03-09 DIAGNOSIS — M54.9 DORSALGIA, UNSPECIFIED: ICD-10-CM

## 2023-03-09 DIAGNOSIS — H91.91 UNSPECIFIED HEARING LOSS, RIGHT EAR: ICD-10-CM

## 2023-03-09 DIAGNOSIS — Z12.4 ENCOUNTER FOR SCREENING FOR MALIGNANT NEOPLASM OF CERVIX: ICD-10-CM

## 2023-03-09 DIAGNOSIS — Z12.39 ENCOUNTER FOR OTHER SCREENING FOR MALIGNANT NEOPLASM OF BREAST: ICD-10-CM

## 2023-03-09 DIAGNOSIS — E66.9 OBESITY, UNSPECIFIED: ICD-10-CM

## 2023-03-09 DIAGNOSIS — Z12.11 ENCOUNTER FOR SCREENING FOR MALIGNANT NEOPLASM OF COLON: ICD-10-CM

## 2023-03-09 PROCEDURE — 99396 PREV VISIT EST AGE 40-64: CPT | Mod: 25

## 2023-03-09 PROCEDURE — 93000 ELECTROCARDIOGRAM COMPLETE: CPT

## 2023-03-10 DIAGNOSIS — R31.21 ASYMPTOMATIC MICROSCOPIC HEMATURIA: ICD-10-CM

## 2023-03-10 LAB
25(OH)D3 SERPL-MCNC: 33.4 NG/ML
ALBUMIN SERPL ELPH-MCNC: 3.8 G/DL
ALP BLD-CCNC: 90 U/L
ALT SERPL-CCNC: 19 U/L
ANION GAP SERPL CALC-SCNC: 11 MMOL/L
APPEARANCE: ABNORMAL
AST SERPL-CCNC: 19 U/L
BASOPHILS # BLD AUTO: 0.06 K/UL
BASOPHILS NFR BLD AUTO: 0.9 %
BILIRUB SERPL-MCNC: 0.4 MG/DL
BILIRUBIN URINE: NEGATIVE
BLOOD URINE: NEGATIVE
BUN SERPL-MCNC: 14 MG/DL
CALCIUM SERPL-MCNC: 9.6 MG/DL
CHLORIDE SERPL-SCNC: 107 MMOL/L
CHOLEST SERPL-MCNC: 188 MG/DL
CO2 SERPL-SCNC: 24 MMOL/L
COLOR: YELLOW
CREAT SERPL-MCNC: 0.72 MG/DL
EGFR: 95 ML/MIN/1.73M2
EOSINOPHIL # BLD AUTO: 0.21 K/UL
EOSINOPHIL NFR BLD AUTO: 3.1 %
ESTIMATED AVERAGE GLUCOSE: 114 MG/DL
GLUCOSE QUALITATIVE U: NEGATIVE
GLUCOSE SERPL-MCNC: 91 MG/DL
HBA1C MFR BLD HPLC: 5.6 %
HCT VFR BLD CALC: 41.5 %
HDLC SERPL-MCNC: 53 MG/DL
HGB BLD-MCNC: 13.2 G/DL
IMM GRANULOCYTES NFR BLD AUTO: 0.3 %
KETONES URINE: NEGATIVE
LDLC SERPL CALC-MCNC: 126 MG/DL
LEUKOCYTE ESTERASE URINE: ABNORMAL
LYMPHOCYTES # BLD AUTO: 2.9 K/UL
LYMPHOCYTES NFR BLD AUTO: 42.5 %
MAN DIFF?: NORMAL
MCHC RBC-ENTMCNC: 29.1 PG
MCHC RBC-ENTMCNC: 31.8 GM/DL
MCV RBC AUTO: 91.6 FL
MONOCYTES # BLD AUTO: 0.88 K/UL
MONOCYTES NFR BLD AUTO: 12.9 %
NEUTROPHILS # BLD AUTO: 2.76 K/UL
NEUTROPHILS NFR BLD AUTO: 40.3 %
NITRITE URINE: NEGATIVE
NONHDLC SERPL-MCNC: 135 MG/DL
PH URINE: 7.5
PLATELET # BLD AUTO: 336 K/UL
POTASSIUM SERPL-SCNC: 4.3 MMOL/L
PROT SERPL-MCNC: 6.6 G/DL
PROTEIN URINE: NORMAL
RBC # BLD: 4.53 M/UL
RBC # FLD: 13.6 %
SODIUM SERPL-SCNC: 142 MMOL/L
SPECIFIC GRAVITY URINE: 1.03
TRIGL SERPL-MCNC: 45 MG/DL
TSH SERPL-ACNC: 1.64 UIU/ML
UROBILINOGEN URINE: NORMAL
WBC # FLD AUTO: 6.83 K/UL

## 2023-03-12 ENCOUNTER — APPOINTMENT (OUTPATIENT)
Dept: MRI IMAGING | Facility: CLINIC | Age: 63
End: 2023-03-12

## 2023-03-15 ENCOUNTER — APPOINTMENT (OUTPATIENT)
Dept: CARDIOLOGY | Facility: CLINIC | Age: 63
End: 2023-03-15
Payer: MEDICAID

## 2023-03-15 VITALS
HEIGHT: 67 IN | SYSTOLIC BLOOD PRESSURE: 120 MMHG | WEIGHT: 222 LBS | BODY MASS INDEX: 34.84 KG/M2 | DIASTOLIC BLOOD PRESSURE: 82 MMHG | OXYGEN SATURATION: 98 % | HEART RATE: 104 BPM

## 2023-03-15 DIAGNOSIS — R07.89 OTHER CHEST PAIN: ICD-10-CM

## 2023-03-15 DIAGNOSIS — R06.09 OTHER FORMS OF DYSPNEA: ICD-10-CM

## 2023-03-15 PROCEDURE — 99204 OFFICE O/P NEW MOD 45 MIN: CPT

## 2023-03-16 NOTE — REVIEW OF SYSTEMS
[Weight Gain (___ Lbs)] : [unfilled] ~Ulb weight gain [Dyspnea on exertion] : dyspnea during exertion [Chest Discomfort] : chest discomfort [Negative] : Heme/Lymph

## 2023-03-16 NOTE — PHYSICAL EXAM

## 2023-03-16 NOTE — ASSESSMENT
[FreeTextEntry1] : CV risk factors.\par Unable to exercise on treadmill for stress test due to chronic low back pain and sciatica.

## 2023-03-16 NOTE — HISTORY OF PRESENT ILLNESS
[FreeTextEntry1] : MOSES STONE is a 62 year old female referred for consultation due to abn ECG.\par Recent ECG and labs reviewed.\par Patient has no prior cardiac history.\par She reports dyspnea on exertion that is of recent onset - occurs with walking or going up stairs.\par She is limited in her ability to exercise due to chronic low back pain and sciatica.\par She has gained weight.\par Also reports occas sporadic left sided chest pain that is not exertional.\par No lower ext edema or pain.\par + occas palpitations.\par No presyncope.\par \par

## 2023-03-16 NOTE — DISCUSSION/SUMMARY
[FreeTextEntry1] : Sched echocardiogram to eval LV function.\par Pharmacologic nuclear stress test to eval for myocardial ischemia.\par Advised diet and wt loss.\par

## 2023-03-23 ENCOUNTER — APPOINTMENT (OUTPATIENT)
Dept: CARDIOLOGY | Facility: CLINIC | Age: 63
End: 2023-03-23
Payer: MEDICAID

## 2023-03-23 PROCEDURE — 93306 TTE W/DOPPLER COMPLETE: CPT

## 2023-03-24 ENCOUNTER — APPOINTMENT (OUTPATIENT)
Dept: GASTROENTEROLOGY | Facility: CLINIC | Age: 63
End: 2023-03-24
Payer: MEDICAID

## 2023-03-24 VITALS
DIASTOLIC BLOOD PRESSURE: 80 MMHG | TEMPERATURE: 96.9 F | OXYGEN SATURATION: 99 % | HEART RATE: 70 BPM | WEIGHT: 221 LBS | HEIGHT: 67 IN | BODY MASS INDEX: 34.69 KG/M2 | SYSTOLIC BLOOD PRESSURE: 118 MMHG

## 2023-03-24 DIAGNOSIS — K59.09 OTHER CONSTIPATION: ICD-10-CM

## 2023-03-24 DIAGNOSIS — Z87.19 PERSONAL HISTORY OF OTHER DISEASES OF THE DIGESTIVE SYSTEM: ICD-10-CM

## 2023-03-24 DIAGNOSIS — R13.10 DYSPHAGIA, UNSPECIFIED: ICD-10-CM

## 2023-03-24 PROCEDURE — 99214 OFFICE O/P EST MOD 30 MIN: CPT

## 2023-03-24 RX ORDER — NAPROXEN 500 MG/1
500 TABLET ORAL TWICE DAILY
Qty: 14 | Refills: 0 | Status: DISCONTINUED | COMMUNITY
Start: 2023-02-16 | End: 2023-03-24

## 2023-03-24 NOTE — PHYSICAL EXAM
[Alert] : alert [Normal Voice/Communication] : normal voice/communication [Healthy Appearing] : healthy appearing [No Acute Distress] : no acute distress [Sclera] : the sclera and conjunctiva were normal [Hearing Threshold Finger Rub Not [place]] : hearing was normal [Normal Appearance] : the appearance of the neck was normal [No Respiratory Distress] : no respiratory distress [No Acc Muscle Use] : no accessory muscle use [Respiration, Rhythm And Depth] : normal respiratory rhythm and effort [Heart Rate And Rhythm] : heart rate was normal and rhythm regular [None] : no edema [Bowel Sounds] : normal bowel sounds [Abdomen Tenderness] : non-tender [No Masses] : no abdominal mass palpated [Abdomen Soft] : soft [] : no hepatosplenomegaly [Oriented To Time, Place, And Person] : oriented to person, place, and time [de-identified] : A chaperone was present in the examining room during all aspects of the physical examination, pleasant female

## 2023-03-24 NOTE — HISTORY OF PRESENT ILLNESS
[FreeTextEntry1] : Dr. Perry takes care this pleasant 62-year-old female\par \par Having some mild heartburn\par \par Occasional vague sense of dysphagia solids more than liquids in the mid to upper thorax area\par \par No anorexia, nausea, vomiting\par \par No abdominal discomfort\par \par No fever or chills\par \par No weight loss\par \par No change in bowel habit\par \par Mild chronic constipation generally well managed with high-fiber diet and very occasionally Dulcolax\par \par Colonoscopy in 2020 with polyp removed and due for colonoscopy in 2025\par \par The patient is having a nuclear stress test in another week, no chest pain or shortness of breath, she says he was possible abnormality on EKG

## 2023-03-24 NOTE — ASSESSMENT
[FreeTextEntry1] : Impression\par \par Mild heartburn\par \par Some dysphagia, more solids and liquids\par \par Stress test with nuclear in another week or so\par \par Suggest\par \par Complete cardiac work-up\par \par When cardiac cleared\par \par Upper endoscopy\par \par Possible dilation\par \par Risks/benefits:\par The procedure, the risks and benefits and alternatives have been reviewed in great detail with the patient.  Risks including, but not limited to sedation such as cardiac and pulmonary compromise, the procedure itself such as bleeding requiring hospitalization, transfusion, surgery, temporary or permanent colostomy.  Perforation or puncture of the requiring hospitalization, surgery, temporary colostomy.\par \par The patient expresses understanding of the procedure and consents to undergoing the procedure.\par \par

## 2023-03-24 NOTE — REASON FOR VISIT
[Follow-up] : a follow-up of an existing diagnosis [FreeTextEntry1] : Mild heartburn, some vague sense of dysphagia to solids more than liquids

## 2023-03-28 ENCOUNTER — NON-APPOINTMENT (OUTPATIENT)
Age: 63
End: 2023-03-28

## 2023-03-31 ENCOUNTER — APPOINTMENT (OUTPATIENT)
Dept: CARDIOLOGY | Facility: CLINIC | Age: 63
End: 2023-03-31
Payer: MEDICAID

## 2023-03-31 PROCEDURE — A9500: CPT

## 2023-03-31 PROCEDURE — 78452 HT MUSCLE IMAGE SPECT MULT: CPT

## 2023-03-31 PROCEDURE — 93015 CV STRESS TEST SUPVJ I&R: CPT

## 2023-04-06 ENCOUNTER — EMERGENCY (EMERGENCY)
Facility: HOSPITAL | Age: 63
LOS: 1 days | Discharge: HOME | End: 2023-04-06
Attending: EMERGENCY MEDICINE
Payer: MEDICAID

## 2023-04-06 VITALS
SYSTOLIC BLOOD PRESSURE: 147 MMHG | WEIGHT: 222.01 LBS | TEMPERATURE: 98 F | HEART RATE: 93 BPM | HEIGHT: 67.5 IN | DIASTOLIC BLOOD PRESSURE: 85 MMHG | RESPIRATION RATE: 18 BRPM | OXYGEN SATURATION: 97 %

## 2023-04-06 VITALS
HEART RATE: 74 BPM | TEMPERATURE: 98 F | SYSTOLIC BLOOD PRESSURE: 126 MMHG | OXYGEN SATURATION: 99 % | DIASTOLIC BLOOD PRESSURE: 83 MMHG | RESPIRATION RATE: 16 BRPM

## 2023-04-06 PROCEDURE — 71046 X-RAY EXAM CHEST 2 VIEWS: CPT

## 2023-04-06 PROCEDURE — 72100 X-RAY EXAM L-S SPINE 2/3 VWS: CPT

## 2023-04-06 PROCEDURE — 71046 X-RAY EXAM CHEST 2 VIEWS: CPT | Mod: 26

## 2023-04-06 PROCEDURE — 99284 EMERGENCY DEPT VISIT MOD MDM: CPT

## 2023-04-06 PROCEDURE — 99284 EMERGENCY DEPT VISIT MOD MDM: CPT | Mod: 23

## 2023-04-06 PROCEDURE — 93005 ELECTROCARDIOGRAM TRACING: CPT

## 2023-04-06 PROCEDURE — 72100 X-RAY EXAM L-S SPINE 2/3 VWS: CPT | Mod: 26

## 2023-04-06 RX ORDER — DIAZEPAM 5 MG
1 TABLET ORAL
Qty: 9 | Refills: 0
Start: 2023-04-06 | End: 2023-04-14

## 2023-04-06 RX ORDER — IBUPROFEN 200 MG
600 TABLET ORAL ONCE
Refills: 0 | Status: COMPLETED | OUTPATIENT
Start: 2023-04-06 | End: 2023-04-06

## 2023-04-06 RX ORDER — LIDOCAINE 4 G/100G
1 CREAM TOPICAL ONCE
Refills: 0 | Status: COMPLETED | OUTPATIENT
Start: 2023-04-06 | End: 2023-04-06

## 2023-04-06 RX ORDER — DIAZEPAM 5 MG
5 TABLET ORAL ONCE
Refills: 0 | Status: DISCONTINUED | OUTPATIENT
Start: 2023-04-06 | End: 2023-04-06

## 2023-04-06 RX ADMIN — Medication 600 MILLIGRAM(S): at 19:14

## 2023-04-06 RX ADMIN — LIDOCAINE 1 PATCH: 4 CREAM TOPICAL at 19:14

## 2023-04-06 RX ADMIN — Medication 5 MILLIGRAM(S): at 19:14

## 2023-04-06 NOTE — ED PROVIDER NOTE - PATIENT PORTAL LINK FT
You can access the FollowMyHealth Patient Portal offered by [facility] by registering at the following website: [email or web address]. By joining [employer]’s FollowMyHealth portal, you will also be able to view your health information using other applications (apps) compatible with our system.

## 2023-04-06 NOTE — ED PROVIDER NOTE - NSFOLLOWUPINSTRUCTIONS_ED_ALL_ED_FT
You were seen in the Emergency Room today because of back pain. A copy of your results is included in your discharge paperwork.     Please follow-up with your Primary Care Physician within the week.   We also recommend you following up with your Orthopedist for further management and workup.     We have sent medication to your Pharmacy. It is called Valium. This is for muscle spasms.   Do not drive or operate heavy machinery while using this medicine. You can take it at night to avoid dizziness.     WHAT YOU NEED TO KNOW:    Back pain is common. You may have back pain and muscle spasms. You may feel sore or stiff on one or both sides of your back. The pain may spread to your lower body. Conditions that affect the spine, joints, or muscles can cause back pain. These may include arthritis, spinal stenosis (narrowing of the spinal column), muscle tension, or breakdown of the spinal discs.    DISCHARGE INSTRUCTIONS:    Call your local emergency number (911 in the [place]) if:   •You have severe back pain with chest pain, nausea, vomiting, or that is spreading.   •You cannot control your urine or bowel movements.  •Your pain becomes so severe that you cannot walk.  •You have pain, numbness, or weakness in one or both legs.    Call your primary care doctor if:   •You have back pain that does not get better with rest and pain medicine.  •You have a fever.  •You have pain that worsens when you are on your back or when you rest.  •You have pain that worsens when you cough or sneeze.  •You lose weight without trying.  •You have questions or concerns about your condition or care.      How to manage your back pain:   •Apply ice on your back for 15 to 20 minutes every hour or as directed. Use an ice pack, or put crushed ice in a plastic bag. Cover it with a towel before you apply it to your skin. Ice helps prevent tissue damage and decreases pain.  •Apply heat on your back for 20 to 30 minutes every 2 hours for as many days as directed. Heat helps decrease pain and muscle spasms.  •Stay active as much as you can without causing more pain. Bed rest could make your back pain worse. Avoid heavy lifting until your pain is gone.  •Go to physical therapy as directed. A physical therapist can teach you exercises to help improve movement and strength, and to decrease pain.      Follow up with your doctor in 2 weeks, or as directed: You might need to see a specialist. Write down your questions so you remember to ask them during your visits.

## 2023-04-06 NOTE — ED PROVIDER NOTE - PROGRESS NOTE DETAILS
Pradip, PGY2 - pain improved. waiting for xray read Pradip, PGY2 - no fractures on xray. patient has her own orthopedist she will follow up with. Patient stable for discharge. Understands the Emergency Room work-up and discharge precautions. Will follow-up with ortho Pradip, PGY2 - no fractures on xray. patient has her own orthopedist she will follow up with. Patient stable for discharge. Understands the Emergency Room work-up and discharge precautions. Will follow-up with ortho    Attending Statement: Agree with the above.  Improved pain, normal ambulation, comfortable c DC plan.  --ANTHONYM

## 2023-04-06 NOTE — ED PROVIDER NOTE - OBJECTIVE STATEMENT
62-year-old woman with prior thoracic spine surgery, on gabapentin, presenting due to right lower back pain and spasming, with intermittent radiation to the right chest.  Patient states that at times she also gets these pains over her right shoulder and the left upper back.  Went to see her neurologist recently where she got an injection into the scar tissue of her thoracic spine, started developing pain a few days later.  Patient denies fevers,  changes in sensation or motor strength.

## 2023-04-06 NOTE — ED PROVIDER NOTE - CLINICAL SUMMARY MEDICAL DECISION MAKING FREE TEXT BOX
Pradip, PGY2 - 62-year-old woman with prior thoracic surgery, presenting due to paraspinal back pain with intermittent radiation to the right chest.  Patient denies actual chest pain at this time, although chest pain that she feels is radiation from the back.  Back pain worsens with movement, is spasming on exam.  Meds, reassess.  X-rays to rule out any possible spinal involvement post scar tissue injections.  EKG, assess need for additional labs at that time. *The above represents an initial assessment/impression. Please refer to progress notes for potential changes in patient clinical course* Pradip, PGY2 - 62-year-old woman with prior thoracic surgery, presenting due to paraspinal back pain with intermittent radiation to the right chest.  Patient denies actual chest pain at this time, although chest pain that she feels is radiation from the back.  Back pain worsens with movement, is spasming on exam.  Meds, reassess.  X-rays to rule out any possible spinal involvement post scar tissue injections.  EKG, assess need for additional labs at that time. *The above represents an initial assessment/impression. Please refer to progress notes for potential changes in patient clinical course*    Attending Statement: Agree with the above.  Reproducible MSK back/neck pain x several weeks but worsening.  Nonexertional, worse c range of motion, no SOB/CP.  Triage comment re CP noted but patient specifies that she believes the pain raps around from her thoracic spine anteriorly; no discrete CP.  +R parathoracic soft tissue hypertonicity and tenderness to palpation.  No concern for acute coronary syndrome, PE, PTX, DANIELLA, or other emergent condisions.  No neuro deficits throughout so doubt cord compression or similar.  No clinical e/o shingles.  Given age and quasi-CP will check EKG.  XRs as above.  Pain Rx, likely d/c if non-actionable workup.  --BMM

## 2023-04-06 NOTE — ED ADULT NURSE NOTE - OBJECTIVE STATEMENT
61 y/o female A&XO4, came to the ED with complaints of right shoulder, neck and right lower back pains with spasms, radiating on and off to her chest. right shoulder and upper back pains are chronic. History of pack surgery and injections to her T spine with pain occurring a few days after the shot. Denies numbness and tingling, dizziness, sensation and motor strength changes. Able to ambulate and move with difficulty.

## 2023-04-06 NOTE — ED PROVIDER NOTE - PHYSICAL EXAMINATION
Gen: NAD, AOx3, able to make needs known, non-toxic  Head: NCAT  HEENT: EOMI, normal conjunctiva  Lung: no respiratory distress, speaking in full sentences  CV: pulses bilaterally   MSK: no visible bony deformities. +right paraspinal tenderness with palpation, no rashes or vesicles visualized on exam   Neuro: No focal sensory or motor deficits  Skin: Warm, well perfused, no rash  Psych: normal affect

## 2023-04-06 NOTE — ED ADULT NURSE NOTE - NS ED NURSE RECORD ANOTHER VITAL SIGN
Problem: Pressure Injury - Risk of  Goal: *Prevention of pressure injury  Description: Document George Scale and appropriate interventions in the flowsheet. Outcome: Progressing Towards Goal  Note: Pressure Injury Interventions:  Sensory Interventions: Assess changes in LOC, Discuss PT/OT consult with provider    Moisture Interventions: Absorbent underpads, Check for incontinence Q2 hours and as needed    Activity Interventions: Pressure redistribution bed/mattress(bed type), PT/OT evaluation    Mobility Interventions: HOB 30 degrees or less, Pressure redistribution bed/mattress (bed type), PT/OT evaluation    Nutrition Interventions: Document food/fluid/supplement intake    Friction and Shear Interventions: HOB 30 degrees or less                Problem: Patient Education: Go to Patient Education Activity  Goal: Patient/Family Education  Outcome: Progressing Towards Goal     Problem: Falls - Risk of  Goal: *Absence of Falls  Description: Document Forrest Fall Risk and appropriate interventions in the flowsheet.   Outcome: Progressing Towards Goal  Note: Fall Risk Interventions:  Mobility Interventions: Bed/chair exit alarm, Patient to call before getting OOB    Mentation Interventions: Bed/chair exit alarm, Door open when patient unattended    Medication Interventions: Bed/chair exit alarm, Patient to call before getting OOB    Elimination Interventions: Call light in reach, Bed/chair exit alarm, Patient to call for help with toileting needs    History of Falls Interventions: Bed/chair exit alarm, Door open when patient unattended         Problem: Patient Education: Go to Patient Education Activity  Goal: Patient/Family Education  Outcome: Progressing Towards Goal     Problem: Hemorrhagic Stroke:  3-24 hours  Goal: Off Pathway (Use only if patient is Off Pathway)  Outcome: Progressing Towards Goal  Goal: Activity/Safety  Outcome: Progressing Towards Goal  Goal: Consults, if ordered  Outcome: Progressing Towards Goal  Goal: Diagnostic Test/Procedures  Outcome: Progressing Towards Goal  Goal: Nutrition/Diet  Outcome: Progressing Towards Goal  Goal: Discharge Planning  Outcome: Progressing Towards Goal  Goal: Medications  Outcome: Progressing Towards Goal  Goal: Respiratory  Outcome: Progressing Towards Goal  Goal: Treatments/Interventions/Procedures  Outcome: Progressing Towards Goal  Goal: Psychosocial  Outcome: Progressing Towards Goal  Goal: *Hemodynamically stable  Outcome: Progressing Towards Goal  Goal: *Verbalizes anxiety and depression are reduced or absent  Outcome: Progressing Towards Goal  Goal: *Absence of aspiration  Outcome: Progressing Towards Goal  Goal: *Absence of signs and symptoms of DVT  Outcome: Progressing Towards Goal  Goal: *Optimal pain control at patient's stated goal  Outcome: Progressing Towards Goal  Goal: *Tolerating diet  Outcome: Progressing Towards Goal  Goal: *Progressive mobility and function (eg: ADL's)  Outcome: Progressing Towards Goal  Goal: *Rehabilitation readiness  Outcome: Progressing Towards Goal Yes

## 2023-04-10 ENCOUNTER — NON-APPOINTMENT (OUTPATIENT)
Age: 63
End: 2023-04-10

## 2023-04-18 ENCOUNTER — APPOINTMENT (OUTPATIENT)
Dept: ORTHOPEDIC SURGERY | Facility: CLINIC | Age: 63
End: 2023-04-18
Payer: MEDICAID

## 2023-04-18 PROCEDURE — 99213 OFFICE O/P EST LOW 20 MIN: CPT

## 2023-04-18 NOTE — PHYSICAL EXAM
[de-identified] : General: No acute distress\par Mental: Alert and oriented x3\par Eyes: Conjunctivitis not seen\par Chest: Symmetric chest rise, no audible wheezing\par Skin: Bilateral lower extremities absent from rashes and ulcers\par Abdomen: No distention\par \par Right shoulder:\par Inspection: No swelling, ecchymosis or gross deformity.\par Skin: No masses, No lesions\par Tenderness: No bicipital tenderness, + tenderness to the greater tuberosity/RTC insertion, + anterior shoulder/lesser tuberosity tenderness. No tenderness SC joint, clavicle, AC joint.\par ROM: Active 90/30/upper lumbar region, passive flexion 160\par Impingement tests: Positive Galindo, positive Anjana\par AC Joint: Positive pain with cross arm testing\par Biceps: Negative speed, negative Yergason\par Strength: 4/5 forward flexion and external rotation at neutral, 5/5 abduction and internal rotation\par Neuro: AIN, PIN, Ulnar nerve motor intact\par Sensation: Intact to light touch in radial, median, ulnar, and axillary nerve distributions\par Vasc: 2+ radial pulse\par \par Left shoulder:\par Inspection: No swelling, ecchymosis or gross deformity.\par Skin: No masses, No lesions\par Tenderness: No bicipital tenderness, no tenderness to the greater tuberosity/RTC insertion, no anterior shoulder/lesser tuberosity tenderness. No tenderness SC joint, clavicle, AC joint.\par ROM: 170/50/upper lumbar\par Impingement tests: Negative Galindo, negative Anjana\par AC Joint: no pain with cross arm testing\par Biceps: Negative speed\par Strength: 5/5 abduction, external rotation, and internal rotation\par Neuro: AIN, PIN, Ulnar nerve motor intact\par Sensation: Intact to light touch in radial, median, ulnar, and axillary nerve distributions\par Vasc: 2+ radial pulse [de-identified] : Right shoulder MRI from 10/12/2022 showing rotator cuff tendinosis, possible areas of full-thickness tear.

## 2023-04-18 NOTE — HISTORY OF PRESENT ILLNESS
[de-identified] : 62-year-old female presents with chronic right shoulder pain for the past few years and worsening over the last 6 months with moderate to severe intensity.  No associated injury.  She describes pain and stiffness with reaching overhead and away from her, and pain at night.  There is intermittent radiation down towards the hand and towards the lateral aspect of her neck, but she denies distal numbness or tingling.  She has seen at least 2 other orthopedic surgeons for this.  She is also dealing with chronic neck and back pain with history of arachnoid cyst excision in 2019 in [place].  Regarding the shoulder she has done physical therapy which did not help, and has had at least 1 injection which did not help her symptoms.  She has taken oral prednisone prescribed by her rheumatologist and Advil at times.  She is unable to take Tylenol due to allergy.  Additionally she is on disability for chronic back pain.  She had an MRI of the right shoulder in October 2022.

## 2023-04-18 NOTE — DISCUSSION/SUMMARY
[de-identified] : Chronic right shoulder pain and MRI findings demonstrating rotator cuff tendinopathy with possible full-thickness tear.  Discussion had with the patient.  She is in physical therapy and had an injection which did not help.  She is concerned about the cyst on MRI and would like to have a surgical discussion.  She will be referred to sports medicine with Dr. Bullock.  In the meantime continue stretching exercises, ice, Advil as needed.\par She is seeking out another spine specialist for her chronic back and neck pain.\par Follow-up as needed.

## 2023-04-19 ENCOUNTER — APPOINTMENT (OUTPATIENT)
Dept: ORTHOPEDIC SURGERY | Facility: CLINIC | Age: 63
End: 2023-04-19
Payer: MEDICAID

## 2023-04-19 VITALS — HEIGHT: 67 IN | BODY MASS INDEX: 35 KG/M2 | WEIGHT: 223 LBS

## 2023-04-19 DIAGNOSIS — M75.101 UNSPECIFIED ROTATOR CUFF TEAR OR RUPTURE OF RIGHT SHOULDER, NOT SPECIFIED AS TRAUMATIC: ICD-10-CM

## 2023-04-19 PROCEDURE — 99214 OFFICE O/P EST MOD 30 MIN: CPT

## 2023-04-19 NOTE — PHYSICAL EXAM
[de-identified] : General Exam\par \par Well developed, well nourished\par No apparent distress\par Oriented to person, place, and time\par Mood: Normal\par Affect: Normal\par Balance and coordination: Normal\par Gait: Normal\par \par Right shoulder exam\par \par Inspection: No swelling, ecchymosis or gross deformity.\par Skin: No masses, No lesions\par Tenderness: No bicipital tenderness, no tenderness to the greater tuberosity/RTC insertion, no anterior shoulder/lesser tuberosity tenderness. No tenderness SC joint, clavicle, AC joint.\par ROM: Active forward elevation 90 degrees passive 220 degrees.  External rotation 40 degrees\par Impingement tests: Positive Galindo\par AC Joint: no pain with cross arm testing\par Biceps: Negative speed\par Strength: 5-/5 abduction, 5 out of 5 external rotation, and internal rotation\par Neuro: AIN, PIN, Ulnar nerve motor intact\par Sensation: Intact to light touch in radial, median, ulnar, and axillary nerve distributions\par Vasc: 2+ radial pulse\par  [de-identified] : Radiographs and MRI obtained previously were reviewed.  There is a full-thickness tear of the supraspinatus tendon with some retraction.  There is delamination with an intramuscular cyst.  There is tendinosis of the long head of the biceps

## 2023-04-19 NOTE — HISTORY OF PRESENT ILLNESS
[de-identified] : 62-year-old female presents with chronic right shoulder pain for the past few years and worsening over the last 6 months with moderate to severe intensity. No associated injury. She describes pain and stiffness with reaching overhead and away from her, and pain at night. There is intermittent radiation down towards the hand and towards the lateral aspect of her neck, but she denies distal numbness or tingling. She has seen at least 2 other orthopedic surgeons for this. She is also dealing with chronic neck and back pain with history of arachnoid cyst excision in 2019 in [place]. Regarding the shoulder she has done physical therapy which did not help, and has had at least 1 injection which did not help her symptoms. She has taken oral prednisone prescribed by her rheumatologist and Advil at times.  She had an MRI of the right shoulder in October 2022.  She did see Dr Espinoza for this issue and was referred here today for further evaluation. \par \par The patient's past medical history, past surgical history, medications, allergies, and social history were reviewed by me today with the patient and documented accordingly. In addition, the patient's family history, which is noncontributory to this visit, was also reviewed.\par

## 2023-04-19 NOTE — DISCUSSION/SUMMARY
[de-identified] : 62-year-old female right shoulder rotator cuff tear biceps tenosynovitis.  We discussed treatment options at length.  We discussed nonoperative care with activity modification physical therapy medications injections.  We discussed surgical treatment.  Right shoulder arthroscopy rotator cuff repair possible biceps tenodesis.  Risks benefits alternatives discussed at length including but not limited to risks such as bleeding infection nerve or vessel injury continued pain stiffness retear need for future surgery medical complications such as DVT or PE and anesthesia complications.  All questions were answered she will schedule at her convenience

## 2023-04-20 ENCOUNTER — APPOINTMENT (OUTPATIENT)
Dept: GASTROENTEROLOGY | Facility: AMBULATORY MEDICAL SERVICES | Age: 63
End: 2023-04-20
Payer: MEDICAID

## 2023-04-20 DIAGNOSIS — K29.80 DUODENITIS W/OUT BLEEDING: ICD-10-CM

## 2023-04-20 PROCEDURE — 43239 EGD BIOPSY SINGLE/MULTIPLE: CPT

## 2023-04-27 ENCOUNTER — APPOINTMENT (OUTPATIENT)
Dept: RHEUMATOLOGY | Facility: CLINIC | Age: 63
End: 2023-04-27

## 2023-04-27 ENCOUNTER — NON-APPOINTMENT (OUTPATIENT)
Age: 63
End: 2023-04-27

## 2023-05-09 ENCOUNTER — NON-APPOINTMENT (OUTPATIENT)
Age: 63
End: 2023-05-09

## 2023-05-13 ENCOUNTER — APPOINTMENT (OUTPATIENT)
Dept: ULTRASOUND IMAGING | Facility: CLINIC | Age: 63
End: 2023-05-13
Payer: MEDICAID

## 2023-05-13 PROCEDURE — 76700 US EXAM ABDOM COMPLETE: CPT | Mod: 26

## 2023-05-15 ENCOUNTER — NON-APPOINTMENT (OUTPATIENT)
Age: 63
End: 2023-05-15

## 2023-05-23 ENCOUNTER — OUTPATIENT (OUTPATIENT)
Dept: OUTPATIENT SERVICES | Facility: HOSPITAL | Age: 63
LOS: 1 days | Discharge: HOME | End: 2023-05-23
Payer: MEDICAID

## 2023-05-23 VITALS
HEIGHT: 67 IN | DIASTOLIC BLOOD PRESSURE: 82 MMHG | SYSTOLIC BLOOD PRESSURE: 129 MMHG | HEART RATE: 63 BPM | TEMPERATURE: 98 F | RESPIRATION RATE: 18 BRPM | OXYGEN SATURATION: 99 % | WEIGHT: 224.43 LBS

## 2023-05-23 DIAGNOSIS — K21.9 GASTRO-ESOPHAGEAL REFLUX DISEASE WITHOUT ESOPHAGITIS: Chronic | ICD-10-CM

## 2023-05-23 DIAGNOSIS — M75.101 UNSPECIFIED ROTATOR CUFF TEAR OR RUPTURE OF RIGHT SHOULDER, NOT SPECIFIED AS TRAUMATIC: ICD-10-CM

## 2023-05-23 DIAGNOSIS — Z98.891 HISTORY OF UTERINE SCAR FROM PREVIOUS SURGERY: Chronic | ICD-10-CM

## 2023-05-23 DIAGNOSIS — Z98.890 OTHER SPECIFIED POSTPROCEDURAL STATES: Chronic | ICD-10-CM

## 2023-05-23 DIAGNOSIS — M25.511 PAIN IN RIGHT SHOULDER: ICD-10-CM

## 2023-05-23 DIAGNOSIS — Z01.818 ENCOUNTER FOR OTHER PREPROCEDURAL EXAMINATION: ICD-10-CM

## 2023-05-23 LAB
ANION GAP SERPL CALC-SCNC: 5 MMOL/L — SIGNIFICANT CHANGE UP (ref 5–17)
BUN SERPL-MCNC: 12 MG/DL — SIGNIFICANT CHANGE UP (ref 7–23)
CALCIUM SERPL-MCNC: 9.1 MG/DL — SIGNIFICANT CHANGE UP (ref 8.5–10.1)
CHLORIDE SERPL-SCNC: 111 MMOL/L — HIGH (ref 96–108)
CO2 SERPL-SCNC: 28 MMOL/L — SIGNIFICANT CHANGE UP (ref 22–31)
CREAT SERPL-MCNC: 0.7 MG/DL — SIGNIFICANT CHANGE UP (ref 0.5–1.3)
EGFR: 98 ML/MIN/1.73M2 — SIGNIFICANT CHANGE UP
GLUCOSE SERPL-MCNC: 84 MG/DL — SIGNIFICANT CHANGE UP (ref 70–99)
HCT VFR BLD CALC: 39.6 % — SIGNIFICANT CHANGE UP (ref 34.5–45)
HGB BLD-MCNC: 13.3 G/DL — SIGNIFICANT CHANGE UP (ref 11.5–15.5)
MCHC RBC-ENTMCNC: 29.6 PG — SIGNIFICANT CHANGE UP (ref 27–34)
MCHC RBC-ENTMCNC: 33.6 G/DL — SIGNIFICANT CHANGE UP (ref 32–36)
MCV RBC AUTO: 88.2 FL — SIGNIFICANT CHANGE UP (ref 80–100)
NRBC # BLD: 0 /100 WBCS — SIGNIFICANT CHANGE UP (ref 0–0)
PLATELET # BLD AUTO: 299 K/UL — SIGNIFICANT CHANGE UP (ref 150–400)
POTASSIUM SERPL-MCNC: 4 MMOL/L — SIGNIFICANT CHANGE UP (ref 3.5–5.3)
POTASSIUM SERPL-SCNC: 4 MMOL/L — SIGNIFICANT CHANGE UP (ref 3.5–5.3)
RBC # BLD: 4.49 M/UL — SIGNIFICANT CHANGE UP (ref 3.8–5.2)
RBC # FLD: 13.7 % — SIGNIFICANT CHANGE UP (ref 10.3–14.5)
SODIUM SERPL-SCNC: 144 MMOL/L — SIGNIFICANT CHANGE UP (ref 135–145)
WBC # BLD: 5.58 K/UL — SIGNIFICANT CHANGE UP (ref 3.8–10.5)
WBC # FLD AUTO: 5.58 K/UL — SIGNIFICANT CHANGE UP (ref 3.8–10.5)

## 2023-05-23 PROCEDURE — 93010 ELECTROCARDIOGRAM REPORT: CPT

## 2023-05-23 NOTE — H&P PST ADULT - ASSESSMENT
62F pmhx gerd c/o right shoulder pain 2/2 rotator cuff tear here for PsT for scheduled right shoulder arthroscopy rotator cuff repair  CAPRINI SCORE [CLOT]    AGE RELATED RISK FACTORS                                                       MOBILITY RELATED FACTORS  [ ] Age 41-60 years                                            (1 Point)                  [ ] Bed rest                                                        (1 Point)  [x ] Age: 61-74 years                                           (2 Points)                 [ ] Plaster cast                                                   (2 Points)  [ ] Age= 75 years                                              (3 Points)                 [ ] Bed bound for more than 72 hours                 (2 Points)    DISEASE RELATED RISK FACTORS                                               GENDER SPECIFIC FACTORS  [x ] Edema in the lower extremities                       (1 Point)                  [ ] Pregnancy                                                     (1 Point)  [ ] Varicose veins                                               (1 Point)                  [ ] Post-partum < 6 weeks                                   (1 Point)             [x ] BMI > 25 Kg/m2                                            (1 Point)                  [ ] Hormonal therapy  or oral contraception          (1 Point)                 [ ] Sepsis (in the previous month)                        (1 Point)                  [ ] History of pregnancy complications                 (1 point)  [ ] Pneumonia or serious lung disease                                               [ ] Unexplained or recurrent                     (1 Point)           (in the previous month)                               (1 Point)  [ ] Abnormal pulmonary function test                     (1 Point)                 SURGERY RELATED RISK FACTORS  [ ] Acute myocardial infarction                              (1 Point)                 [ ]  Section                                             (1 Point)  [ ] Congestive heart failure (in the previous month)  (1 Point)               [ ] Minor surgery                                                  (1 Point)   [ ] Inflammatory bowel disease                             (1 Point)                 [ x] Arthroscopic surgery                                        (2 Points)  [ ] Central venous access                                      (2 Points)                [ ] General surgery lasting more than 45 minutes   (2 Points)       [ ] Stroke (in the previous month)                          (5 Points)               [ ] Elective arthroplasty                                         (5 Points)                                                                                                                                               HEMATOLOGY RELATED FACTORS                                                 TRAUMA RELATED RISK FACTORS  [ ] Prior episodes of VTE                                     (3 Points)                [ ] Fracture of the hip, pelvis, or leg                       (5 Points)  [ ] Positive family history for VTE                         (3 Points)                 [ ] Acute spinal cord injury (in the previous month)  (5 Points)  [ ] Prothrombin 93357 A                                     (3 Points)                 [ ] Paralysis  (less than 1 month)                             (5 Points)  [ ] Factor V Leiden                                             (3 Points)                  [ ] Multiple Trauma within 1 month                        (5 Points)  [ ] Lupus anticoagulants                                     (3 Points)                                                           [ ] Anticardiolipin antibodies                               (3 Points)                                                       [ ] High homocysteine in the blood                      (3 Points)                                             [ ] Other congenital or acquired thrombophilia      (3 Points)                                                [ ] Heparin induced thrombocytopenia                  (3 Points)                                          Total Score [    6     ]    Caprini Score 0 - 2:  Low Risk, No VTE Prophylaxis required for most patients, encourage ambulation  Caprini Score 3 - 6:  At Risk, pharmacologic VTE prophylaxis is indicated for most patients (in the absence of a contraindication)  Caprini Score Greater than or = 7:  High Risk, pharmacologic VTE prophylaxis is indicated for most patients (in the absence of a contraindication)

## 2023-05-23 NOTE — H&P PST ADULT - NSANTHOSAYNRD_GEN_A_CORE
No. CARMELO screening performed.  STOP BANG Legend: 0-2 = LOW Risk; 3-4 = INTERMEDIATE Risk; 5-8 = HIGH Risk

## 2023-05-23 NOTE — H&P PST ADULT - PROBLEM SELECTOR PLAN 1
right shoulder arthroscopy rotator cuff repair  Pre-op instructions given by RN, patient verbalized understanding  Chlorhexidine wash instructions given   medical clearance

## 2023-05-23 NOTE — H&P PST ADULT - NSICDXPASTSURGICALHX_GEN_ALL_CORE_FT
PAST SURGICAL HISTORY:  GERD (gastroesophageal reflux disease)     H/O Spinal surgery     H/O:

## 2023-05-23 NOTE — H&P PST ADULT - HISTORY OF PRESENT ILLNESS
62F pmhx gerd c/o right shoulder pain 2/2 rotator cuff tear here for PsT for scheduled right shoulder arthroscopy rotator cuff repair  This patient denies any fever, cough, sob, flu like symptoms or travel outside of the [place] in the past 30 days   this patient is not vaccinated for covid

## 2023-05-25 ENCOUNTER — APPOINTMENT (OUTPATIENT)
Dept: CARDIOLOGY | Facility: CLINIC | Age: 63
End: 2023-05-25
Payer: MEDICAID

## 2023-05-25 VITALS
HEART RATE: 69 BPM | BODY MASS INDEX: 34.53 KG/M2 | SYSTOLIC BLOOD PRESSURE: 134 MMHG | WEIGHT: 220 LBS | DIASTOLIC BLOOD PRESSURE: 84 MMHG | HEIGHT: 67 IN | OXYGEN SATURATION: 98 %

## 2023-05-25 VITALS — DIASTOLIC BLOOD PRESSURE: 80 MMHG | SYSTOLIC BLOOD PRESSURE: 130 MMHG

## 2023-05-25 DIAGNOSIS — Z01.810 ENCOUNTER FOR PREPROCEDURAL CARDIOVASCULAR EXAMINATION: ICD-10-CM

## 2023-05-25 PROBLEM — K21.9 GASTRO-ESOPHAGEAL REFLUX DISEASE WITHOUT ESOPHAGITIS: Chronic | Status: ACTIVE | Noted: 2023-05-23

## 2023-05-25 PROCEDURE — 99214 OFFICE O/P EST MOD 30 MIN: CPT

## 2023-05-25 RX ORDER — GABAPENTIN 300 MG/1
300 CAPSULE ORAL
Qty: 30 | Refills: 1 | Status: DISCONTINUED | COMMUNITY
Start: 2021-01-19 | End: 2023-05-25

## 2023-05-25 RX ORDER — NORTRIPTYLINE HYDROCHLORIDE 10 MG/1
10 CAPSULE ORAL
Qty: 30 | Refills: 0 | Status: DISCONTINUED | COMMUNITY
Start: 2022-06-01 | End: 2023-05-25

## 2023-05-25 RX ORDER — NABUMETONE 750 MG/1
750 TABLET, FILM COATED ORAL
Qty: 60 | Refills: 4 | Status: DISCONTINUED | COMMUNITY
Start: 2022-10-18 | End: 2023-05-25

## 2023-05-25 NOTE — HISTORY OF PRESENT ILLNESS
[FreeTextEntry1] : Scheduled for right shoulder surgery on 6/5 at [facility] by Dr Bullock for rotator cuff repair.\par She also has chronic thoracic spine pain and neck pain which she attributes to her prior spine surgery.\par No chest pain or SOB.\par Meds reviewed, she takes Advil prn prn pain but is stopping for 7 days pre-op.\par Pre-op labs and ECG reviewed.\par \par

## 2023-05-25 NOTE — DISCUSSION/SUMMARY
[FreeTextEntry1] : Patient is at low estimated cardiac risk for orthopedic surgery as planned.\par Medical and cardiac status optimized without contraindication to surgery.\par

## 2023-06-01 ENCOUNTER — APPOINTMENT (OUTPATIENT)
Dept: INTERNAL MEDICINE | Facility: CLINIC | Age: 63
End: 2023-06-01
Payer: MEDICAID

## 2023-06-01 VITALS
HEART RATE: 77 BPM | HEIGHT: 67 IN | BODY MASS INDEX: 34.53 KG/M2 | SYSTOLIC BLOOD PRESSURE: 138 MMHG | WEIGHT: 220 LBS | TEMPERATURE: 97.7 F | OXYGEN SATURATION: 100 % | DIASTOLIC BLOOD PRESSURE: 82 MMHG

## 2023-06-01 VITALS — SYSTOLIC BLOOD PRESSURE: 130 MMHG | DIASTOLIC BLOOD PRESSURE: 82 MMHG

## 2023-06-01 DIAGNOSIS — Z01.818 ENCOUNTER FOR OTHER PREPROCEDURAL EXAMINATION: ICD-10-CM

## 2023-06-01 PROCEDURE — 99214 OFFICE O/P EST MOD 30 MIN: CPT

## 2023-06-04 ENCOUNTER — TRANSCRIPTION ENCOUNTER (OUTPATIENT)
Age: 63
End: 2023-06-04

## 2023-06-05 ENCOUNTER — OUTPATIENT (OUTPATIENT)
Dept: OUTPATIENT SERVICES | Facility: HOSPITAL | Age: 63
LOS: 1 days | Discharge: HOME | End: 2023-06-05
Payer: MEDICAID

## 2023-06-05 ENCOUNTER — APPOINTMENT (OUTPATIENT)
Dept: ORTHOPEDIC SURGERY | Facility: HOSPITAL | Age: 63
End: 2023-06-05

## 2023-06-05 ENCOUNTER — TRANSCRIPTION ENCOUNTER (OUTPATIENT)
Age: 63
End: 2023-06-05

## 2023-06-05 VITALS
HEART RATE: 68 BPM | OXYGEN SATURATION: 95 % | RESPIRATION RATE: 16 BRPM | SYSTOLIC BLOOD PRESSURE: 128 MMHG | DIASTOLIC BLOOD PRESSURE: 84 MMHG

## 2023-06-05 VITALS
HEIGHT: 67 IN | DIASTOLIC BLOOD PRESSURE: 85 MMHG | HEART RATE: 75 BPM | WEIGHT: 222.01 LBS | TEMPERATURE: 98 F | OXYGEN SATURATION: 99 % | RESPIRATION RATE: 16 BRPM | SYSTOLIC BLOOD PRESSURE: 125 MMHG

## 2023-06-05 DIAGNOSIS — Z98.890 OTHER SPECIFIED POSTPROCEDURAL STATES: Chronic | ICD-10-CM

## 2023-06-05 DIAGNOSIS — K21.9 GASTRO-ESOPHAGEAL REFLUX DISEASE WITHOUT ESOPHAGITIS: Chronic | ICD-10-CM

## 2023-06-05 DIAGNOSIS — Z98.891 HISTORY OF UTERINE SCAR FROM PREVIOUS SURGERY: Chronic | ICD-10-CM

## 2023-06-05 PROCEDURE — 29827 SHO ARTHRS SRG RT8TR CUF RPR: CPT | Mod: RT

## 2023-06-05 PROCEDURE — 29826 SHO ARTHRS SRG DECOMPRESSION: CPT | Mod: RT

## 2023-06-05 DEVICE — ANCHOR BIO-COMP SWIVLCK DBL LOADED 4.75X22MM MIN ORDER 5: Type: IMPLANTABLE DEVICE | Site: RIGHT | Status: FUNCTIONAL

## 2023-06-05 RX ORDER — FENTANYL CITRATE 50 UG/ML
25 INJECTION INTRAVENOUS ONCE
Refills: 0 | Status: DISCONTINUED | OUTPATIENT
Start: 2023-06-05 | End: 2023-06-05

## 2023-06-05 RX ORDER — FAMOTIDINE 10 MG/ML
1 INJECTION INTRAVENOUS
Refills: 0 | DISCHARGE

## 2023-06-05 RX ORDER — SODIUM CHLORIDE 9 MG/ML
1000 INJECTION, SOLUTION INTRAVENOUS
Refills: 0 | Status: DISCONTINUED | OUTPATIENT
Start: 2023-06-05 | End: 2023-06-05

## 2023-06-05 RX ORDER — FENTANYL CITRATE 50 UG/ML
50 INJECTION INTRAVENOUS ONCE
Refills: 0 | Status: DISCONTINUED | OUTPATIENT
Start: 2023-06-05 | End: 2023-06-05

## 2023-06-05 RX ORDER — DOCUSATE SODIUM 100 MG
1 CAPSULE ORAL
Qty: 14 | Refills: 0
Start: 2023-06-05 | End: 2023-06-11

## 2023-06-05 RX ORDER — SODIUM CHLORIDE 9 MG/ML
3 INJECTION INTRAMUSCULAR; INTRAVENOUS; SUBCUTANEOUS EVERY 8 HOURS
Refills: 0 | Status: DISCONTINUED | OUTPATIENT
Start: 2023-06-05 | End: 2023-06-05

## 2023-06-05 RX ORDER — OXYCODONE HYDROCHLORIDE 5 MG/1
1 TABLET ORAL
Qty: 20 | Refills: 0
Start: 2023-06-05 | End: 2023-06-09

## 2023-06-05 RX ORDER — ONDANSETRON 8 MG/1
1 TABLET, FILM COATED ORAL
Qty: 21 | Refills: 0
Start: 2023-06-05 | End: 2023-06-11

## 2023-06-05 RX ORDER — PANTOPRAZOLE SODIUM 20 MG/1
1 TABLET, DELAYED RELEASE ORAL
Refills: 0 | DISCHARGE

## 2023-06-05 RX ADMIN — SODIUM CHLORIDE 3 MILLILITER(S): 9 INJECTION INTRAMUSCULAR; INTRAVENOUS; SUBCUTANEOUS at 11:21

## 2023-06-05 NOTE — ASU PREOP CHECKLIST - ALLERGY BAND ON
Teaching-Supervisory Addendum     I participated in the following activities of this patient's care: the medical history, the physical exam, medical decision making.    I personally performed: Supervision of the patient's care, the history, the physical exam, the medical decision making    The case was discussed with: The resident Bina To, DO    Procedures: I was present for key portions of the procedure and was immediately available for the non-key portions.    Evaluation and management service: I agree with the evaluation and management decisions made in this patient's care.    Notes:     60 yo female with h/o DM, HTN, obesity presenting for chest pain. Pt states she developed CP yesterday, substernal and R sided, radiates to right shoulder, c/o dypsnea with exertion, also complaining of a dry cough.  Denies any fever.  Denies any nausea, vomiting or diarrhea.  Denies any sick contacts.  Patient is not vaccinated for COVID-19.  Denies any lower extremity swelling.  States she has never had chest pain like this before.  Patient states her last stress test was many years ago that was normal.  Has a remote history of tobacco use.  Denies any family history of early onset CAD.    On exam patient is sitting up in bed, awake alert, appears comfortable, lungs are clear without wheezing or crackles, cardiac exam with regular rate and rhythm, abdomen is soft nontender, lower extremities without swelling    Patient presenting for chest pain and exertional dyspnea, vital signs on arrival are notable for hypertension 170/180, improved to 143/77 without intervention, otherwise within normal limits, patient afebrile, breathing comfortably and saturating 97% on room air.  Chest x-ray is clear without infiltrate, EKG without ST elevation or depression.  Screening troponin is negative.  Patient with multiple risk factors CAD, with exertional chest pain her heart score is 4, will need to be admitted for stress test.  Also  patient did come back positive for COVID-19, I think this is more likely the primary cause of symptoms, however, she is not hypoxic at this time, relatively minimal symptoms, qualifies for monoclonal antibody infusion and patient decided that she would like to get the infusion, will give that along with admitting to observation for cardiac work-up.  Patient verbalized understanding agreement plan.    Diagnosis: Chest pain, unspecified type  (primary encounter diagnosis)  Exertional dyspnea  Cough     Mary Tejeda MD  10/8/2021 11:19 AM       Mary Tejeda MD  10/08/21 1212     no known allergies

## 2023-06-05 NOTE — ASU PATIENT PROFILE, ADULT - BILL OF RIGHTS/ADMISSION INFORMATION PROVIDED TO:
Patient is calling for a refill of his      budesonide-formoterol (SYMBICORT) 160-4.5 MCG/ACT inhaler 30.6 g 3 Last refilled  2/19/2019     Sig - Route: Inhale 2 puffs into the lungs 2 times daily. - Inhalation      Patient's last physical was 02/19/19.  Acute visit 03/05/19.  Pending physical scheduled 09/14/20.  Ok to refill?   Call if problems at [phone].     Patient

## 2023-06-05 NOTE — ASU DISCHARGE PLAN (ADULT/PEDIATRIC) - CARE PROVIDER_API CALL
Armen Bullock  Orthopaedic Surgery  [address] [place] [address], [facility] [address]  [place] [address]  Phone: [phone]  Fax: [phone]  Follow Up Time:

## 2023-06-05 NOTE — BRIEF OPERATIVE NOTE - PRIMARY SURGEON
Patient Education        Hemorrhoids: Care Instructions  Your Care Instructions     Hemorrhoids are enlarged veins that develop in the anal canal. Bleeding during bowel movements, itching, swelling, and rectal pain are the most common symptoms. They can be uncomfortable at times, but hemorrhoids rarely are a serious problem. You can treat most hemorrhoids with simple changes to your diet and bowel habits. These changes include eating more fiber and not straining to pass stools. Most hemorrhoids do not need surgery or other treatment unless they are very large and painful or bleed a lot. Follow-up care is a key part of your treatment and safety. Be sure to make and go to all appointments, and call your doctor if you are having problems. It's also a good idea to know your test results and keep a list of the medicines you take. How can you care for yourself at home? · Sit in a few inches of warm water (sitz bath) 3 times a day and after bowel movements. The warm water helps with pain and itching. · Put ice on your anal area several times a day for 10 minutes at a time. Put a thin cloth between the ice and your skin. Follow this by placing a warm, wet towel on the area for another 10 to 20 minutes. · Take pain medicines exactly as directed. ? If the doctor gave you a prescription medicine for pain, take it as prescribed. ? If you are not taking a prescription pain medicine, ask your doctor if you can take an over-the-counter medicine. · Keep the anal area clean, but be gentle. Use water and a fragrance-free soap, such as [place], or use baby wipes or medicated pads, such as Tucks. · Wear cotton underwear and loose clothing to decrease moisture in the anal area. · Eat more fiber. Include foods such as whole-grain breads and cereals, raw vegetables, raw and dried fruits, and beans. · Drink plenty of fluids, enough so that your urine is light yellow or clear like water.  If you have kidney, heart, or liver disease Kobe

## 2023-06-05 NOTE — ASU DISCHARGE PLAN (ADULT/PEDIATRIC) - NS MD DC FALL RISK RISK
For information on Fall & Injury Prevention, visit: [email or web address] OR  [email or web address] OR  https://www.cdc.gov/steadi/patient.html

## 2023-06-05 NOTE — ASU PREOP CHECKLIST - NSBLOODTRANS_GEN_A_CORE_SIUH
Patient seen and discussed on Advanced GI rounds. Labs and pertinent radiology studies reviewed. Agree with the above plan and modified where applicable. no...

## 2023-06-05 NOTE — ASU PATIENT PROFILE, ADULT - FALL HARM RISK - UNIVERSAL INTERVENTIONS
Bed in lowest position, wheels locked, appropriate side rails in place/Call bell, personal items and telephone in reach/Instruct patient to call for assistance before getting out of bed or chair/Non-slip footwear when patient is out of bed/[place] to call system/Physically safe environment - no spills, clutter or unnecessary equipment/Purposeful Proactive Rounding/Room/bathroom lighting operational, light cord in reach

## 2023-06-05 NOTE — BRIEF OPERATIVE NOTE - NSICDXBRIEFPROCEDURE_GEN_ALL_CORE_FT
p-test = negative PROCEDURES:  Arthroscopic repair of right rotator cuff 05-Jun-2023 14:49:03  Alonzo Miller

## 2023-06-07 DIAGNOSIS — M75.41 IMPINGEMENT SYNDROME OF RIGHT SHOULDER: ICD-10-CM

## 2023-06-07 DIAGNOSIS — S46.012A STRAIN OF MUSCLE(S) AND TENDON(S) OF THE ROTATOR CUFF OF LEFT SHOULDER, INITIAL ENCOUNTER: ICD-10-CM

## 2023-06-08 DIAGNOSIS — M75.101 UNSPECIFIED ROTATOR CUFF TEAR OR RUPTURE OF RIGHT SHOULDER, NOT SPECIFIED AS TRAUMATIC: ICD-10-CM

## 2023-06-08 DIAGNOSIS — Z88.6 ALLERGY STATUS TO ANALGESIC AGENT: ICD-10-CM

## 2023-06-08 DIAGNOSIS — M65.9 SYNOVITIS AND TENOSYNOVITIS, UNSPECIFIED: ICD-10-CM

## 2023-06-08 DIAGNOSIS — E66.9 OBESITY, UNSPECIFIED: ICD-10-CM

## 2023-06-08 DIAGNOSIS — M25.811 OTHER SPECIFIED JOINT DISORDERS, RIGHT SHOULDER: ICD-10-CM

## 2023-06-08 DIAGNOSIS — F98.8 OTHER SPECIFIED BEHAVIORAL AND EMOTIONAL DISORDERS WITH ONSET USUALLY OCCURRING IN CHILDHOOD AND ADOLESCENCE: ICD-10-CM

## 2023-06-08 DIAGNOSIS — M85.80 OTHER SPECIFIED DISORDERS OF BONE DENSITY AND STRUCTURE, UNSPECIFIED SITE: ICD-10-CM

## 2023-06-08 DIAGNOSIS — K21.9 GASTRO-ESOPHAGEAL REFLUX DISEASE WITHOUT ESOPHAGITIS: ICD-10-CM

## 2023-06-08 DIAGNOSIS — Z88.0 ALLERGY STATUS TO PENICILLIN: ICD-10-CM

## 2023-06-14 ENCOUNTER — NON-APPOINTMENT (OUTPATIENT)
Age: 63
End: 2023-06-14

## 2023-06-19 ENCOUNTER — APPOINTMENT (OUTPATIENT)
Dept: ULTRASOUND IMAGING | Facility: CLINIC | Age: 63
End: 2023-06-19

## 2023-06-21 ENCOUNTER — APPOINTMENT (OUTPATIENT)
Dept: ORTHOPEDIC SURGERY | Facility: CLINIC | Age: 63
End: 2023-06-21
Payer: MEDICAID

## 2023-06-21 VITALS — HEIGHT: 67 IN | BODY MASS INDEX: 34.53 KG/M2 | WEIGHT: 220 LBS

## 2023-06-21 PROCEDURE — 99024 POSTOP FOLLOW-UP VISIT: CPT

## 2023-06-28 ENCOUNTER — APPOINTMENT (OUTPATIENT)
Dept: ORTHOPEDIC SURGERY | Facility: CLINIC | Age: 63
End: 2023-06-28

## 2023-06-30 ENCOUNTER — APPOINTMENT (OUTPATIENT)
Dept: ULTRASOUND IMAGING | Facility: CLINIC | Age: 63
End: 2023-06-30
Payer: MEDICAID

## 2023-06-30 PROCEDURE — 93970 EXTREMITY STUDY: CPT

## 2023-07-05 ENCOUNTER — NON-APPOINTMENT (OUTPATIENT)
Age: 63
End: 2023-07-05

## 2023-07-14 ENCOUNTER — APPOINTMENT (OUTPATIENT)
Dept: ORTHOPEDIC SURGERY | Facility: CLINIC | Age: 63
End: 2023-07-14

## 2023-07-19 ENCOUNTER — APPOINTMENT (OUTPATIENT)
Dept: ORTHOPEDIC SURGERY | Facility: CLINIC | Age: 63
End: 2023-07-19
Payer: MEDICAID

## 2023-07-19 PROCEDURE — 99024 POSTOP FOLLOW-UP VISIT: CPT

## 2023-07-19 NOTE — HISTORY OF PRESENT ILLNESS
[de-identified] : R shoulder [de-identified] : 63yo F s/p Right shoulder arthroscopy, rotator cuff repair, subacromial decompression, 6/5/23.  She is overall doing well she reports minimal pain she stopped using her sling earlier this week she is working with physical therapy she denies numbness and tingling [de-identified] : right shoulder exam.\par inc healed well. no erythema\par no pain gentle passive rom\par able to flex/ext all fingers\par silt r/u/m/ax\par bcr\par  [de-identified] : 63yo F s/p Right shoulder arthroscopy, rotator cuff repair, subacromial decompression, 6/5/23. [de-identified] : We reviewed postoperative protocol and restrictions.  Discontinue sling continue physical therapy she will follow-up in 6 weeks.  All questions answered

## 2023-07-27 ENCOUNTER — APPOINTMENT (OUTPATIENT)
Dept: ORTHOPEDIC SURGERY | Facility: CLINIC | Age: 63
End: 2023-07-27
Payer: MEDICAID

## 2023-07-27 ENCOUNTER — NON-APPOINTMENT (OUTPATIENT)
Age: 63
End: 2023-07-27

## 2023-07-27 DIAGNOSIS — M76.821 POSTERIOR TIBIAL TENDINITIS, RIGHT LEG: ICD-10-CM

## 2023-07-27 DIAGNOSIS — M62.89 OTHER SPECIFIED DISORDERS OF MUSCLE: ICD-10-CM

## 2023-07-27 DIAGNOSIS — M21.6X2 OTHER ACQUIRED DEFORMITIES OF LEFT FOOT: ICD-10-CM

## 2023-07-27 DIAGNOSIS — M79.672 PAIN IN RIGHT FOOT: ICD-10-CM

## 2023-07-27 DIAGNOSIS — M79.671 PAIN IN RIGHT FOOT: ICD-10-CM

## 2023-07-27 DIAGNOSIS — M21.42 FLAT FOOT [PES PLANUS] (ACQUIRED), RIGHT FOOT: ICD-10-CM

## 2023-07-27 DIAGNOSIS — M21.861 OTHER SPECIFIED ACQUIRED DEFORMITIES OF RIGHT LOWER LEG: ICD-10-CM

## 2023-07-27 DIAGNOSIS — M21.41 FLAT FOOT [PES PLANUS] (ACQUIRED), RIGHT FOOT: ICD-10-CM

## 2023-07-27 DIAGNOSIS — M67.88 OTHER SPECIFIED DISORDERS OF SYNOVIUM AND TENDON, OTHER SITE: ICD-10-CM

## 2023-07-27 DIAGNOSIS — M77.8 OTHER ENTHESOPATHIES, NOT ELSEWHERE CLASSIFIED: ICD-10-CM

## 2023-07-27 DIAGNOSIS — M76.822 POSTERIOR TIBIAL TENDINITIS, LEFT LEG: ICD-10-CM

## 2023-07-27 PROCEDURE — 73630 X-RAY EXAM OF FOOT: CPT | Mod: RT

## 2023-07-27 PROCEDURE — 99214 OFFICE O/P EST MOD 30 MIN: CPT

## 2023-07-31 ENCOUNTER — NON-APPOINTMENT (OUTPATIENT)
Age: 63
End: 2023-07-31

## 2023-08-09 ENCOUNTER — APPOINTMENT (OUTPATIENT)
Dept: ORTHOPEDIC SURGERY | Facility: CLINIC | Age: 63
End: 2023-08-09
Payer: MEDICAID

## 2023-08-09 VITALS — BODY MASS INDEX: 34.53 KG/M2 | HEIGHT: 67 IN | WEIGHT: 220 LBS

## 2023-08-09 PROCEDURE — 99024 POSTOP FOLLOW-UP VISIT: CPT

## 2023-08-09 NOTE — HISTORY OF PRESENT ILLNESS
[de-identified] : R shoulder [de-identified] : 61yo F s/p Right shoulder arthroscopy, rotator cuff repair, subacromial decompression she is continuing to complain of pain in her shoulder she reports that she is not wearing her sling but she is wearing it today she reports that she is doing physical therapy [de-identified] : right shoulder exam. inc healed well. no erythema no pain gentle passive rom she is very stiff passive forward elevation only to about 30 or 40 degrees able to flex/ext all fingers silt r/u/m/ax bcr  [de-identified] : 63yo F s/p Right shoulder arthroscopy, rotator cuff repair, subacromial decompression, 6/5/23. [de-identified] : She is very stiff at this time we discussed the importance of physical therapy we are changing her medications to diclofenac.  We discussed the importance of daily stretching exercises I recommend that she remove her sling completely at this time as I did at her last visit she will follow-up in 1 month consider corticosteroid injection if there is no improvement and she is developing adhesive capsulitis clinically postoperatively at this time.  All questions were answered

## 2023-08-24 ENCOUNTER — RX RENEWAL (OUTPATIENT)
Age: 63
End: 2023-08-24

## 2023-08-25 ENCOUNTER — APPOINTMENT (OUTPATIENT)
Dept: INTERNAL MEDICINE | Facility: CLINIC | Age: 63
End: 2023-08-25
Payer: MEDICAID

## 2023-08-25 ENCOUNTER — NON-APPOINTMENT (OUTPATIENT)
Age: 63
End: 2023-08-25

## 2023-08-25 DIAGNOSIS — M47.812 SPONDYLOSIS W/OUT MYELOPATHY OR RADICULOPATHY, CERVICAL REGION: ICD-10-CM

## 2023-08-25 PROCEDURE — 99443: CPT

## 2023-08-30 ENCOUNTER — RX RENEWAL (OUTPATIENT)
Age: 63
End: 2023-08-30

## 2023-08-31 ENCOUNTER — APPOINTMENT (OUTPATIENT)
Dept: ORTHOPEDIC SURGERY | Facility: CLINIC | Age: 63
End: 2023-08-31
Payer: MEDICAID

## 2023-08-31 VITALS — BODY MASS INDEX: 34.53 KG/M2 | HEIGHT: 67 IN | WEIGHT: 220 LBS

## 2023-08-31 VITALS — BODY MASS INDEX: 34.53 KG/M2 | WEIGHT: 220 LBS | HEIGHT: 67 IN

## 2023-08-31 DIAGNOSIS — M54.2 CERVICALGIA: ICD-10-CM

## 2023-08-31 PROCEDURE — 99214 OFFICE O/P EST MOD 30 MIN: CPT | Mod: 25

## 2023-08-31 PROCEDURE — 72040 X-RAY EXAM NECK SPINE 2-3 VW: CPT

## 2023-09-06 ENCOUNTER — APPOINTMENT (OUTPATIENT)
Dept: ORTHOPEDIC SURGERY | Facility: CLINIC | Age: 63
End: 2023-09-06
Payer: MEDICAID

## 2023-09-06 VITALS — BODY MASS INDEX: 34.28 KG/M2 | HEIGHT: 67.5 IN | WEIGHT: 221 LBS

## 2023-09-06 DIAGNOSIS — K63.89 OTHER SPECIFIED DISEASES OF INTESTINE: ICD-10-CM

## 2023-09-06 PROCEDURE — 99213 OFFICE O/P EST LOW 20 MIN: CPT

## 2023-09-06 NOTE — HISTORY OF PRESENT ILLNESS
[de-identified] : 63yo F s/p Right shoulder arthroscopy, rotator cuff repair, subacromial decompression 6/5/23.  She reports stiffness in her shoulder.  She was going to physical therapy twice a Week [facility] but switch to a different facility.  She states she is doing twice a week also at this new facility as well as doing on her own at home.  She reports trouble reaching behind her back and overhead.

## 2023-09-06 NOTE — DISCUSSION/SUMMARY
[de-identified] : We reviewed postoperative protocol and restrictions.  She certainly has some stiffness at this time we discussed the importance of physical therapy home exercises physical therapy was renewed and is medically necessary at this time she will follow-up in 2 months for range of motion check.  All questions were answered

## 2023-09-11 ENCOUNTER — NON-APPOINTMENT (OUTPATIENT)
Age: 63
End: 2023-09-11

## 2023-09-25 PROBLEM — K63.89 SMALL INTESTINAL BACTERIAL OVERGROWTH: Status: ACTIVE | Noted: 2023-09-25

## 2023-09-29 ENCOUNTER — APPOINTMENT (OUTPATIENT)
Dept: ORTHOPEDIC SURGERY | Facility: CLINIC | Age: 63
End: 2023-09-29
Payer: MEDICAID

## 2023-09-29 PROCEDURE — 99213 OFFICE O/P EST LOW 20 MIN: CPT

## 2023-10-17 ENCOUNTER — RX RENEWAL (OUTPATIENT)
Age: 63
End: 2023-10-17

## 2023-11-14 ENCOUNTER — APPOINTMENT (OUTPATIENT)
Dept: ORTHOPEDIC SURGERY | Facility: CLINIC | Age: 63
End: 2023-11-14
Payer: MEDICAID

## 2023-11-14 VITALS — BODY MASS INDEX: 34.75 KG/M2 | WEIGHT: 224 LBS | HEIGHT: 67.5 IN

## 2023-11-14 DIAGNOSIS — M47.812 SPONDYLOSIS W/OUT MYELOPATHY OR RADICULOPATHY, CERVICAL REGION: ICD-10-CM

## 2023-11-14 DIAGNOSIS — M50.30 OTHER CERVICAL DISC DEGENERATION, UNSPECIFIED CERVICAL REGION: ICD-10-CM

## 2023-11-14 PROCEDURE — 99214 OFFICE O/P EST MOD 30 MIN: CPT

## 2023-11-27 ENCOUNTER — NON-APPOINTMENT (OUTPATIENT)
Age: 63
End: 2023-11-27

## 2023-11-29 ENCOUNTER — APPOINTMENT (OUTPATIENT)
Dept: ORTHOPEDIC SURGERY | Facility: CLINIC | Age: 63
End: 2023-11-29
Payer: MEDICAID

## 2023-11-29 VITALS — WEIGHT: 224 LBS | BODY MASS INDEX: 34.75 KG/M2 | HEIGHT: 67.5 IN

## 2023-11-29 PROCEDURE — 99214 OFFICE O/P EST MOD 30 MIN: CPT | Mod: 25

## 2023-11-29 PROCEDURE — 20610 DRAIN/INJ JOINT/BURSA W/O US: CPT | Mod: RT

## 2023-12-07 ENCOUNTER — RX RENEWAL (OUTPATIENT)
Age: 63
End: 2023-12-07

## 2023-12-11 ENCOUNTER — RX RENEWAL (OUTPATIENT)
Age: 63
End: 2023-12-11

## 2024-01-05 ENCOUNTER — APPOINTMENT (OUTPATIENT)
Dept: ORTHOPEDIC SURGERY | Facility: CLINIC | Age: 64
End: 2024-01-05

## 2024-01-06 ENCOUNTER — APPOINTMENT (OUTPATIENT)
Dept: MAMMOGRAPHY | Facility: CLINIC | Age: 64
End: 2024-01-06

## 2024-01-10 ENCOUNTER — OUTPATIENT (OUTPATIENT)
Dept: OUTPATIENT SERVICES | Facility: HOSPITAL | Age: 64
LOS: 1 days | End: 2024-01-10
Payer: MEDICAID

## 2024-01-10 ENCOUNTER — APPOINTMENT (OUTPATIENT)
Dept: MAMMOGRAPHY | Facility: CLINIC | Age: 64
End: 2024-01-10
Payer: MEDICAID

## 2024-01-10 ENCOUNTER — RESULT REVIEW (OUTPATIENT)
Age: 64
End: 2024-01-10

## 2024-01-10 DIAGNOSIS — Z98.890 OTHER SPECIFIED POSTPROCEDURAL STATES: Chronic | ICD-10-CM

## 2024-01-10 DIAGNOSIS — Z12.39 ENCOUNTER FOR OTHER SCREENING FOR MALIGNANT NEOPLASM OF BREAST: ICD-10-CM

## 2024-01-10 DIAGNOSIS — Z98.891 HISTORY OF UTERINE SCAR FROM PREVIOUS SURGERY: Chronic | ICD-10-CM

## 2024-01-10 DIAGNOSIS — Z00.8 ENCOUNTER FOR OTHER GENERAL EXAMINATION: ICD-10-CM

## 2024-01-10 DIAGNOSIS — K21.9 GASTRO-ESOPHAGEAL REFLUX DISEASE WITHOUT ESOPHAGITIS: Chronic | ICD-10-CM

## 2024-01-10 PROCEDURE — 77067 SCR MAMMO BI INCL CAD: CPT

## 2024-01-10 PROCEDURE — 77063 BREAST TOMOSYNTHESIS BI: CPT

## 2024-01-10 PROCEDURE — 77063 BREAST TOMOSYNTHESIS BI: CPT | Mod: 26

## 2024-01-10 PROCEDURE — 77067 SCR MAMMO BI INCL CAD: CPT | Mod: 26

## 2024-01-12 ENCOUNTER — APPOINTMENT (OUTPATIENT)
Dept: ORTHOPEDIC SURGERY | Facility: CLINIC | Age: 64
End: 2024-01-12
Payer: MEDICAID

## 2024-01-12 ENCOUNTER — APPOINTMENT (OUTPATIENT)
Dept: OBGYN | Facility: CLINIC | Age: 64
End: 2024-01-12
Payer: MEDICAID

## 2024-01-12 VITALS — WEIGHT: 225 LBS | HEIGHT: 67.5 IN | BODY MASS INDEX: 34.9 KG/M2

## 2024-01-12 VITALS
BODY MASS INDEX: 34.9 KG/M2 | DIASTOLIC BLOOD PRESSURE: 70 MMHG | SYSTOLIC BLOOD PRESSURE: 120 MMHG | HEIGHT: 67.5 IN | WEIGHT: 225 LBS

## 2024-01-12 DIAGNOSIS — Z01.419 ENCOUNTER FOR GYNECOLOGICAL EXAMINATION (GENERAL) (ROUTINE) W/OUT ABNORMAL FINDINGS: ICD-10-CM

## 2024-01-12 DIAGNOSIS — M43.16 SPONDYLOLISTHESIS, LUMBAR REGION: ICD-10-CM

## 2024-01-12 DIAGNOSIS — M48.07 SPINAL STENOSIS, LUMBOSACRAL REGION: ICD-10-CM

## 2024-01-12 PROCEDURE — 99215 OFFICE O/P EST HI 40 MIN: CPT | Mod: 25

## 2024-01-12 PROCEDURE — 99396 PREV VISIT EST AGE 40-64: CPT

## 2024-01-12 PROCEDURE — 72100 X-RAY EXAM L-S SPINE 2/3 VWS: CPT

## 2024-01-12 NOTE — HISTORY OF PRESENT ILLNESS
[postmenopausal] : postmenopausal [Patient refuses STI testing] : Patient refuses STI testing [FreeTextEntry1] : 64 yo presents for annual exam and pap smear.  No complaints at this time. [Mammogramdate] : 1/2024 [BreastSonogramDate] : 1/2024 [PapSmeardate] : 2023 [BoneDensityDate] : 2020 [LMPDate] : 2008

## 2024-01-12 NOTE — PHYSICAL EXAM
[Appropriately responsive] : appropriately responsive [Alert] : alert [No Acute Distress] : no acute distress [Soft] : soft [Non-tender] : non-tender [Examination Of The Breasts] : a normal appearance [No Masses] : no breast masses were palpable [Labia Majora] : normal [Labia Minora] : normal [Atrophy] : atrophy [Normal] : normal [Uterine Adnexae] : non-palpable

## 2024-01-12 NOTE — DISCUSSION/SUMMARY
Re entered lab orders- patient forgot to mention that he was not fasting. He will schedule a nurse visit.    [FreeTextEntry1] : Pap smear DEXA RTO in 1 year or PRN

## 2024-01-16 ENCOUNTER — OUTPATIENT (OUTPATIENT)
Dept: OUTPATIENT SERVICES | Facility: HOSPITAL | Age: 64
LOS: 1 days | End: 2024-01-16
Payer: MEDICAID

## 2024-01-16 ENCOUNTER — APPOINTMENT (OUTPATIENT)
Dept: RADIOLOGY | Facility: CLINIC | Age: 64
End: 2024-01-16
Payer: MEDICAID

## 2024-01-16 ENCOUNTER — RESULT REVIEW (OUTPATIENT)
Age: 64
End: 2024-01-16

## 2024-01-16 DIAGNOSIS — Z98.891 HISTORY OF UTERINE SCAR FROM PREVIOUS SURGERY: Chronic | ICD-10-CM

## 2024-01-16 DIAGNOSIS — K21.9 GASTRO-ESOPHAGEAL REFLUX DISEASE WITHOUT ESOPHAGITIS: Chronic | ICD-10-CM

## 2024-01-16 DIAGNOSIS — Z00.8 ENCOUNTER FOR OTHER GENERAL EXAMINATION: ICD-10-CM

## 2024-01-16 DIAGNOSIS — Z98.890 OTHER SPECIFIED POSTPROCEDURAL STATES: Chronic | ICD-10-CM

## 2024-01-16 LAB — HPV HIGH+LOW RISK DNA PNL CVX: NOT DETECTED

## 2024-01-16 PROCEDURE — 77080 DXA BONE DENSITY AXIAL: CPT

## 2024-01-16 PROCEDURE — 77080 DXA BONE DENSITY AXIAL: CPT | Mod: 26

## 2024-01-19 DIAGNOSIS — M85.80 OTHER SPECIFIED DISORDERS OF BONE DENSITY AND STRUCTURE, UNSPECIFIED SITE: ICD-10-CM

## 2024-01-19 LAB — CYTOLOGY CVX/VAG DOC THIN PREP: ABNORMAL

## 2024-01-23 RX ORDER — LYSINE HCL 500 MG
600-400 TABLET ORAL DAILY
Qty: 30 | Refills: 5 | Status: ACTIVE | COMMUNITY
Start: 2024-01-19 | End: 1900-01-01

## 2024-01-25 NOTE — H&P PST ADULT - I HAVE PERSONALLY SEEN AND EXAMINED THE PATIENT. THERE HAVE NOT BEEN ANY CHANGES IN THE PATIENT'S HISTORY OR EXAM UNLESS COMMENTED BELOW
Patient: Terell Sebastian    Procedure: Procedure(s):  RIGHT EYE 25G PARSPLANA VITRECTOMY, SCLERAL BUCKLE, ENDOLASER, AIR FLUID EXCHANGE, INFUSION OF 20% SF6 GAS, retinal detachment repair       Diagnosis: Right retinal detachment [H33.21]  Diagnosis Additional Information: No value filed.    Anesthesia Type:   MAC     Note:    Oropharynx: oropharynx clear of all foreign objects and spontaneously breathing  Level of Consciousness: awake  Oxygen Supplementation: room air    Independent Airway: airway patency satisfactory and stable  Dentition: dentition unchanged  Vital Signs Stable: post-procedure vital signs reviewed and stable  Report to RN Given: handoff report given  Patient transferred to: Phase II  Comments: Uneventful transport - Head down position  Report to RN - Angi GRIFFITH  Exchanging well; color natl  Pt responds appropriately to command  IV patent  Lips/teeth/dentition as preop status  Questions answered  Pt comfortable    Handoff Report: Identifed the Patient, Identified the Reponsible Provider, Reviewed the pertinent medical history, Discussed the surgical course, Reviewed Intra-OP anesthesia mangement and issues during anesthesia, Set expectations for post-procedure period and Allowed opportunity for questions and acknowledgement of understanding      Vitals:  Vitals Value Taken Time   /66 1442   Temp 97.5 1442   Pulse 81 1442   Resp 14 1442   SpO2 100% room air 1442       Electronically Signed By: ISIDRA TYSON CRNA  January 25, 2024  1:43 PM   Statement Selected

## 2024-01-29 ENCOUNTER — APPOINTMENT (OUTPATIENT)
Dept: PHYSICAL MEDICINE AND REHAB | Facility: CLINIC | Age: 64
End: 2024-01-29

## 2024-01-31 ENCOUNTER — APPOINTMENT (OUTPATIENT)
Dept: ORTHOPEDIC SURGERY | Facility: CLINIC | Age: 64
End: 2024-01-31
Payer: MEDICAID

## 2024-01-31 VITALS — WEIGHT: 225 LBS | HEIGHT: 67.5 IN | BODY MASS INDEX: 34.9 KG/M2

## 2024-01-31 DIAGNOSIS — G89.29 PAIN IN RIGHT SHOULDER: ICD-10-CM

## 2024-01-31 DIAGNOSIS — M67.911 UNSPECIFIED DISORDER OF SYNOVIUM AND TENDON, RIGHT SHOULDER: ICD-10-CM

## 2024-01-31 DIAGNOSIS — M25.511 PAIN IN RIGHT SHOULDER: ICD-10-CM

## 2024-01-31 PROCEDURE — 99213 OFFICE O/P EST LOW 20 MIN: CPT

## 2024-01-31 NOTE — HISTORY OF PRESENT ILLNESS
[de-identified] : 62yo F s/p Right shoulder arthroscopy, rotator cuff repair, subacromial decompression 6/5/23.  She reports stiffness in her shoulder.  She continues to go to PT.  She has difficulty lifting her arm.  She has difficulty reaching behind her back.  She states she is doing home exercises as well.  Stiffness worse in the morning.  No new injuries.

## 2024-01-31 NOTE — PHYSICAL EXAM
[de-identified] : Right shoulder exam  Inspection: No swelling, ecchymosis or gross deformity. Skin: No masses, No lesions Tenderness: + bicipital tenderness, + tenderness to the greater tuberosity/RTC insertion, no anterior shoulder/lesser tuberosity tenderness. No tenderness SC joint, clavicle, AC joint. ROM: 120/340/L5 Impingement tests: Positive Galindo AC Joint: no pain with cross arm testing Biceps: Negative speed Strength: 5/5 abduction, external rotation, and internal rotation Neuro: AIN, PIN, Ulnar nerve motor intact Sensation: Intact to light touch in radial, median, ulnar, and axillary nerve distributions Vasc: 2+ radial pulse

## 2024-01-31 NOTE — DISCUSSION/SUMMARY
[de-identified] : She is now 7 months status post rotator cuff repair she does have some improvement in her range of motion since the last visit although she still has some pain.  We will obtain an MRI to assess the integrity of the repair she will continue with physical therapy she will follow-up after MRI

## 2024-02-01 ENCOUNTER — OUTPATIENT (OUTPATIENT)
Dept: OUTPATIENT SERVICES | Facility: HOSPITAL | Age: 64
LOS: 1 days | End: 2024-02-01
Payer: MEDICAID

## 2024-02-01 ENCOUNTER — APPOINTMENT (OUTPATIENT)
Dept: MRI IMAGING | Facility: CLINIC | Age: 64
End: 2024-02-01
Payer: MEDICAID

## 2024-02-01 DIAGNOSIS — M67.911 UNSPECIFIED DISORDER OF SYNOVIUM AND TENDON, RIGHT SHOULDER: ICD-10-CM

## 2024-02-01 DIAGNOSIS — K21.9 GASTRO-ESOPHAGEAL REFLUX DISEASE WITHOUT ESOPHAGITIS: Chronic | ICD-10-CM

## 2024-02-01 DIAGNOSIS — Z98.890 OTHER SPECIFIED POSTPROCEDURAL STATES: Chronic | ICD-10-CM

## 2024-02-01 DIAGNOSIS — Z98.891 HISTORY OF UTERINE SCAR FROM PREVIOUS SURGERY: Chronic | ICD-10-CM

## 2024-02-01 PROCEDURE — 73221 MRI JOINT UPR EXTREM W/O DYE: CPT | Mod: 26,RT

## 2024-02-01 PROCEDURE — 73221 MRI JOINT UPR EXTREM W/O DYE: CPT

## 2024-02-03 ENCOUNTER — NON-APPOINTMENT (OUTPATIENT)
Age: 64
End: 2024-02-03

## 2024-03-01 ENCOUNTER — NON-APPOINTMENT (OUTPATIENT)
Age: 64
End: 2024-03-01

## 2024-03-01 ENCOUNTER — APPOINTMENT (OUTPATIENT)
Dept: INTERNAL MEDICINE | Facility: CLINIC | Age: 64
End: 2024-03-01
Payer: MEDICAID

## 2024-03-01 VITALS
WEIGHT: 221 LBS | RESPIRATION RATE: 16 BRPM | HEIGHT: 67.5 IN | BODY MASS INDEX: 34.28 KG/M2 | OXYGEN SATURATION: 96 % | SYSTOLIC BLOOD PRESSURE: 128 MMHG | TEMPERATURE: 98.1 F | HEART RATE: 73 BPM | DIASTOLIC BLOOD PRESSURE: 80 MMHG

## 2024-03-01 DIAGNOSIS — Z00.00 ENCOUNTER FOR GENERAL ADULT MEDICAL EXAMINATION W/OUT ABNORMAL FINDINGS: ICD-10-CM

## 2024-03-01 DIAGNOSIS — M54.12 RADICULOPATHY, CERVICAL REGION: ICD-10-CM

## 2024-03-01 DIAGNOSIS — R94.31 ABNORMAL ELECTROCARDIOGRAM [ECG] [EKG]: ICD-10-CM

## 2024-03-01 DIAGNOSIS — M51.36 OTHER INTERVERTEBRAL DISC DEGENERATION, LUMBAR REGION: ICD-10-CM

## 2024-03-01 PROCEDURE — 99396 PREV VISIT EST AGE 40-64: CPT | Mod: 25

## 2024-03-01 PROCEDURE — 93000 ELECTROCARDIOGRAM COMPLETE: CPT

## 2024-03-01 RX ORDER — KETOROLAC TROMETHAMINE 10 MG/1
10 TABLET, FILM COATED ORAL 3 TIMES DAILY
Qty: 60 | Refills: 0 | Status: DISCONTINUED | COMMUNITY
Start: 2023-06-07 | End: 2024-03-01

## 2024-03-01 RX ORDER — PREDNISONE 20 MG/1
20 TABLET ORAL
Qty: 30 | Refills: 0 | Status: DISCONTINUED | COMMUNITY
Start: 2023-01-24 | End: 2024-03-01

## 2024-03-01 RX ORDER — IBUPROFEN 800 MG/1
800 TABLET, FILM COATED ORAL 3 TIMES DAILY
Qty: 60 | Refills: 1 | Status: DISCONTINUED | COMMUNITY
Start: 2023-06-21 | End: 2024-03-01

## 2024-03-01 RX ORDER — RIFAXIMIN 550 MG/1
550 TABLET ORAL
Qty: 42 | Refills: 0 | Status: DISCONTINUED | COMMUNITY
Start: 2023-09-25 | End: 2024-03-01

## 2024-03-01 RX ORDER — OXYCODONE 5 MG/1
5 TABLET ORAL
Qty: 42 | Refills: 0 | Status: DISCONTINUED | COMMUNITY
Start: 2023-06-14 | End: 2024-03-01

## 2024-03-01 RX ORDER — FAMOTIDINE 40 MG/1
40 TABLET, FILM COATED ORAL AT BEDTIME
Qty: 30 | Refills: 2 | Status: DISCONTINUED | COMMUNITY
Start: 2023-03-24 | End: 2024-03-01

## 2024-03-01 NOTE — PLAN
[FreeTextEntry1] : HCM: - UTD with mammogram  - UTD with pap smear  - UTD with DEXA scan  - Due for colonoscopy, referred to GI Dr. Villalta  - Check EKG  - Check routine labs   Degenertaive disc disease:  - Continue follow up with ortho

## 2024-03-01 NOTE — HISTORY OF PRESENT ILLNESS
[de-identified] : 63 year old female with history of degenerative disc disease who presents today for CPE/to establish care.  Doing well, no acute complaints.  [FreeTextEntry1] : CPE

## 2024-03-01 NOTE — HEALTH RISK ASSESSMENT
[Good] : ~his/her~ current health as good [No] : No [Little interest or pleasure doing things] : 1) Little interest or pleasure doing things [Feeling down, depressed, or hopeless] : 2) Feeling down, depressed, or hopeless [PHQ-2 Negative - No further assessment needed] : PHQ-2 Negative - No further assessment needed [0] : 2) Feeling down, depressed, or hopeless: Not at all (0) [Patient reported mammogram was normal] : Patient reported mammogram was normal [Patient reported bone density results were normal] : Patient reported bone density results were normal [Patient reported PAP Smear was normal] : Patient reported PAP Smear was normal [Patient reported colonoscopy was normal] : Patient reported colonoscopy was normal [With Family] : lives with family [Employed] : employed [] :  [Feels Safe at Home] : Feels safe at home [Fully functional (bathing, dressing, toileting, transferring, walking, feeding)] : Fully functional (bathing, dressing, toileting, transferring, walking, feeding) [Fully functional (using the telephone, shopping, preparing meals, housekeeping, doing laundry, using] : Fully functional and needs no help or supervision to perform IADLs (using the telephone, shopping, preparing meals, housekeeping, doing laundry, using transportation, managing medications and managing finances) [Never] : Never [CIO1Dylre] : 0 [PapSmearDate] : 01/24 [MammogramDate] : 01/24 [BoneDensityDate] : 01/24 [ColonoscopyDate] : 10/20

## 2024-03-06 LAB
ALBUMIN SERPL ELPH-MCNC: 4.2 G/DL
ALP BLD-CCNC: 105 U/L
ALT SERPL-CCNC: 19 U/L
ANION GAP SERPL CALC-SCNC: 11 MMOL/L
APPEARANCE: CLEAR
AST SERPL-CCNC: 18 U/L
BACTERIA: NEGATIVE /HPF
BASOPHILS # BLD AUTO: 0.03 K/UL
BASOPHILS NFR BLD AUTO: 0.5 %
BILIRUB SERPL-MCNC: 0.4 MG/DL
BILIRUBIN URINE: NEGATIVE
BLOOD URINE: NEGATIVE
BUN SERPL-MCNC: 21 MG/DL
CALCIUM SERPL-MCNC: 9.9 MG/DL
CAST: 0 /LPF
CHLORIDE SERPL-SCNC: 105 MMOL/L
CHOLEST SERPL-MCNC: 198 MG/DL
CO2 SERPL-SCNC: 23 MMOL/L
COLOR: YELLOW
CREAT SERPL-MCNC: 0.75 MG/DL
EGFR: 90 ML/MIN/1.73M2
EOSINOPHIL # BLD AUTO: 0.12 K/UL
EOSINOPHIL NFR BLD AUTO: 1.9 %
EPITHELIAL CELLS: 1 /HPF
ESTIMATED AVERAGE GLUCOSE: 108 MG/DL
FOLATE SERPL-MCNC: 14.8 NG/ML
GLUCOSE QUALITATIVE U: NEGATIVE MG/DL
GLUCOSE SERPL-MCNC: 80 MG/DL
HBA1C MFR BLD HPLC: 5.4 %
HCT VFR BLD CALC: 40.5 %
HDLC SERPL-MCNC: 59 MG/DL
HGB BLD-MCNC: 13.6 G/DL
IMM GRANULOCYTES NFR BLD AUTO: 0.2 %
KETONES URINE: NEGATIVE MG/DL
LDLC SERPL CALC-MCNC: 128 MG/DL
LEUKOCYTE ESTERASE URINE: NEGATIVE
LYMPHOCYTES # BLD AUTO: 2.78 K/UL
LYMPHOCYTES NFR BLD AUTO: 44.6 %
MAN DIFF?: NORMAL
MCHC RBC-ENTMCNC: 29.8 PG
MCHC RBC-ENTMCNC: 33.6 GM/DL
MCV RBC AUTO: 88.6 FL
MICROSCOPIC-UA: NORMAL
MONOCYTES # BLD AUTO: 0.64 K/UL
MONOCYTES NFR BLD AUTO: 10.3 %
NEUTROPHILS # BLD AUTO: 2.66 K/UL
NEUTROPHILS NFR BLD AUTO: 42.5 %
NITRITE URINE: NEGATIVE
NONHDLC SERPL-MCNC: 139 MG/DL
PH URINE: 7
PLATELET # BLD AUTO: 275 K/UL
POTASSIUM SERPL-SCNC: 4.5 MMOL/L
PROT SERPL-MCNC: 6.8 G/DL
PROTEIN URINE: NEGATIVE MG/DL
RBC # BLD: 4.57 M/UL
RBC # FLD: 13.6 %
RED BLOOD CELLS URINE: 1 /HPF
SODIUM SERPL-SCNC: 139 MMOL/L
SPECIFIC GRAVITY URINE: 1.02
TRIGL SERPL-MCNC: 58 MG/DL
TSH SERPL-ACNC: 1.95 UIU/ML
UROBILINOGEN URINE: 1 MG/DL
VIT B12 SERPL-MCNC: 635 PG/ML
WBC # FLD AUTO: 6.24 K/UL
WHITE BLOOD CELLS URINE: 0 /HPF

## 2024-03-08 ENCOUNTER — RX RENEWAL (OUTPATIENT)
Age: 64
End: 2024-03-08

## 2024-03-12 ENCOUNTER — APPOINTMENT (OUTPATIENT)
Dept: ORTHOPEDIC SURGERY | Facility: CLINIC | Age: 64
End: 2024-03-12
Payer: COMMERCIAL

## 2024-03-12 ENCOUNTER — RX RENEWAL (OUTPATIENT)
Age: 64
End: 2024-03-12

## 2024-03-12 DIAGNOSIS — M75.01 ADHESIVE CAPSULITIS OF RIGHT SHOULDER: ICD-10-CM

## 2024-03-12 PROCEDURE — 99213 OFFICE O/P EST LOW 20 MIN: CPT

## 2024-03-12 NOTE — HISTORY OF PRESENT ILLNESS
[de-identified] : 62yo F s/p Right shoulder arthroscopy, rotator cuff repair, subacromial decompression 6/5/23.  She reports stiffness in her shoulder.  She continues to go to PT.  She has difficulty lifting her arm.  She has difficulty reaching behind her back.  She states she is doing home exercises as well.  Stiffness worse in the morning.  No new injuries.  She reports that she is work with physical therapy.  She reports some improvement she reports she is not taking anti-inflammatory medications consistently is also complaining of low back pain and radiculopathy.  She presents 30 minutes late to her appointment

## 2024-03-12 NOTE — PHYSICAL EXAM
[de-identified] : Right shoulder exam  Inspection: No swelling, ecchymosis or gross deformity. Skin: No masses, No lesions Tenderness: + bicipital tenderness, + tenderness to the greater tuberosity/RTC insertion, no anterior shoulder/lesser tuberosity tenderness. No tenderness SC joint, clavicle, AC joint. ROM: 120/340/L5 Impingement tests: Positive Galindo AC Joint: no pain with cross arm testing Biceps: Negative speed Strength: 5/5 abduction, external rotation, and internal rotation Neuro: AIN, PIN, Ulnar nerve motor intact Sensation: Intact to light touch in radial, median, ulnar, and axillary nerve distributions Vasc: 2+ radial pulse [de-identified] : MRI reviewed right shoulder there is an intact rotator cuff repair there is mild stress reaction around the anchor.  There is mild biceps tenosynovitis.  There is thickening of the capsule consistent with adhesive capsulitis

## 2024-03-12 NOTE — DISCUSSION/SUMMARY
[de-identified] : History of rotator cuff repair she is experiencing symptoms of adhesive capsulitis in the setting of variable compliance.  She is not taking anti-inflammatory medications or Mobic was renewed side effects were discussed we again discussed the importance of anti-inflammatory medication injection was declined today.  We again discussed the importance of physical therapy.  We discussed that the pain in her lower back radiating to her leg is unrelated to her shoulder and she can consider follow-up with spine and pain management in regards to this.  She will follow-up in 2 months for repeat evaluation of her shoulder.  All questions were answered

## 2024-03-13 NOTE — PHYSICAL EXAM
[facility], Ambulatory Surgical Unit [Appropriately responsive] : appropriately responsive [Alert] : alert [No Acute Distress] : no acute distress [No Lymphadenopathy] : no lymphadenopathy [No Murmurs] : no murmurs [Soft] : soft [Non-tender] : non-tender [Non-distended] : non-distended [No HSM] : No HSM [No Lesions] : no lesions [No Mass] : no mass [Oriented x3] : oriented x3 [Examination Of The Breasts] : a normal appearance [No Masses] : no breast masses were palpable [Labia Majora] : normal [Labia Minora] : normal [Normal] : normal [Uterine Adnexae] : normal 605

## 2024-04-10 RX ORDER — PANTOPRAZOLE 40 MG/1
40 TABLET, DELAYED RELEASE ORAL
Qty: 90 | Refills: 0 | Status: ACTIVE | COMMUNITY
Start: 2023-04-20 | End: 1900-01-01

## 2024-05-08 ENCOUNTER — APPOINTMENT (OUTPATIENT)
Dept: ORTHOPEDIC SURGERY | Facility: CLINIC | Age: 64
End: 2024-05-08
Payer: COMMERCIAL

## 2024-05-08 DIAGNOSIS — M75.41 IMPINGEMENT SYNDROME OF RIGHT SHOULDER: ICD-10-CM

## 2024-05-08 DIAGNOSIS — M75.101 UNSPECIFIED ROTATOR CUFF TEAR OR RUPTURE OF RIGHT SHOULDER, NOT SPECIFIED AS TRAUMATIC: ICD-10-CM

## 2024-05-08 PROCEDURE — 99213 OFFICE O/P EST LOW 20 MIN: CPT

## 2024-05-08 NOTE — DISCUSSION/SUMMARY
[de-identified] : She is now 10 months postoperative.  She is made significant improvements in terms of her range of motion and strength.  She has near full passive range of motion today with some stiffness with active motion her symptoms are consistent with adhesive capsulitis which are shown on a recent MRI with an intact rotator cuff.  We discussed the importance of physical therapy anti-inflammatory medications daily range of motion and stretching exercises for which she expressed understanding.  Recovery has been slower than normal in the setting of developing adhesive capsulitis although this is improving.  Reassurance was provided physical therapy prescription given anti-inflammatory medications as needed for pain.  She will follow-up in 2 to 3 months.  All questions were answered

## 2024-05-08 NOTE — HISTORY OF PRESENT ILLNESS
[de-identified] : 62yo F s/p Right shoulder arthroscopy, rotator cuff repair, subacromial decompression 6/5/23. She reports stiffness in her shoulder. She continues to go to PT. She has difficulty lifting her arm. She has difficulty reaching behind her back. She states she is doing home exercises as well. Stiffness worse in the morning. No new injuries. She reports that she is work with physical therapy. She reports some improvement she reports she is not taking anti-inflammatory medications consistently

## 2024-05-08 NOTE — PHYSICAL EXAM
[de-identified] : Right shoulder exam  Inspection: No swelling, ecchymosis or gross deformity. Skin: No masses, No lesions Tenderness: + bicipital tenderness, + tenderness to the greater tuberosity/RTC insertion, no anterior shoulder/lesser tuberosity tenderness. No tenderness SC joint, clavicle, AC joint. ROM: Forward elevation 140 degrees external rotation 40 degrees internal rotation L5 Impingement tests: Positive Galindo AC Joint: no pain with cross arm testing Biceps: Negative speed Strength: 5/5 abduction, external rotation, and internal rotation Neuro: AIN, PIN, Ulnar nerve motor intact Sensation: Intact to light touch in radial, median, ulnar, and axillary nerve distributions Vasc: 2+ radial pulse

## 2024-05-22 ENCOUNTER — APPOINTMENT (OUTPATIENT)
Dept: GASTROENTEROLOGY | Facility: CLINIC | Age: 64
End: 2024-05-22
Payer: COMMERCIAL

## 2024-05-22 VITALS
TEMPERATURE: 96.9 F | HEART RATE: 80 BPM | BODY MASS INDEX: 34.13 KG/M2 | OXYGEN SATURATION: 97 % | WEIGHT: 220 LBS | SYSTOLIC BLOOD PRESSURE: 131 MMHG | HEIGHT: 67.5 IN | DIASTOLIC BLOOD PRESSURE: 86 MMHG

## 2024-05-22 DIAGNOSIS — R10.13 EPIGASTRIC PAIN: ICD-10-CM

## 2024-05-22 DIAGNOSIS — K21.9 GASTRO-ESOPHAGEAL REFLUX DISEASE W/OUT ESOPHAGITIS: ICD-10-CM

## 2024-05-22 DIAGNOSIS — K59.00 CONSTIPATION, UNSPECIFIED: ICD-10-CM

## 2024-05-22 DIAGNOSIS — R10.9 UNSPECIFIED ABDOMINAL PAIN: ICD-10-CM

## 2024-05-22 DIAGNOSIS — Z86.010 PERSONAL HISTORY OF COLONIC POLYPS: ICD-10-CM

## 2024-05-22 PROCEDURE — 99215 OFFICE O/P EST HI 40 MIN: CPT

## 2024-05-22 RX ORDER — POLYETHYLENE GLYCOL 3350 17 G/17G
17 POWDER, FOR SOLUTION ORAL
Qty: 3 | Refills: 2 | Status: ACTIVE | COMMUNITY
Start: 2024-05-22 | End: 1900-01-01

## 2024-05-22 RX ORDER — TRAMADOL HYDROCHLORIDE 50 MG/1
50 TABLET, COATED ORAL
Qty: 60 | Refills: 0 | Status: DISCONTINUED | COMMUNITY
Start: 2023-08-26 | End: 2024-05-22

## 2024-05-22 RX ORDER — MELOXICAM 15 MG/1
15 TABLET ORAL
Qty: 90 | Refills: 1 | Status: DISCONTINUED | COMMUNITY
Start: 2024-01-12 | End: 2024-05-22

## 2024-05-22 RX ORDER — DICLOFENAC SODIUM 75 MG/1
75 TABLET, DELAYED RELEASE ORAL
Qty: 1 | Refills: 2 | Status: DISCONTINUED | COMMUNITY
Start: 2023-08-09 | End: 2024-05-22

## 2024-05-22 RX ORDER — CYCLOBENZAPRINE HYDROCHLORIDE 5 MG/1
5 TABLET, FILM COATED ORAL
Qty: 30 | Refills: 1 | Status: DISCONTINUED | COMMUNITY
Start: 2023-08-31 | End: 2024-05-22

## 2024-05-22 NOTE — ASSESSMENT
[FreeTextEntry1] : Patient with complaints of diffuse abdominal pain along with epigastric pain.  She has mild tenderness.  She has constipation, going up to a week without a bowel movement.  She has a history of reflux which is controlled with pantoprazole 40 mg a day.  She had a borderline SIBO breath test that did not respond to Xifaxan.  I have sent the patient for CT scan of the abdomen and pelvis to rule out significant pathology causing diffuse abdominal pain and tenderness.  I have started MiraLAX 17 g in 8 ounces of water once a day.  Patient will continue pantoprazole 40 mg a day.  Patient is due for colonoscopy in October 2025 for her history of colonic polyps.  Patient will return to see me in 3 months.

## 2024-05-22 NOTE — REASON FOR VISIT
[Initial Evaluation] : an initial evaluation [FreeTextEntry1] : Diffuse abdominal pain, epigastric pain, constipation, reflux, history of colonic polyps

## 2024-05-22 NOTE — HISTORY OF PRESENT ILLNESS
[FreeTextEntry1] : The patient was a patient of Dr. Lowery, who has passed away.  The patient is now seeing me to transfer care. We spent some time reviewing the patient's medical history.  The patient is a 63-year-old woman who last saw Dr. Lowery in the office on March 24, 2023.  We reviewed the evaluations performed by Dr. Lowery.  The patient's last colonoscopy was performed on October 12, 2020 for a history of colonic polyps.  3 hyperplastic polyps were removed.  The patient also has internal hemorrhoids.  He performed an EGD on April 20, 2023 which was negative with no significant findings on endoscopic biopsies.  An abdominal ultrasound performed on May 13, 2023 was negative.  The patient had breath testing for SIBO on September 6, 2023.  It was interpreted as positive although the increase in hydrogen was not greater than 20.  In either event, the patient was treated with Xifaxan 550 mg 3 times daily for 14 days.  Does not appear that there was a clear response.  The patient complains of pain throughout her abdomen.  She states that this was initially intermittent about once a month but has now become more frequent and has been constant for the last 2 weeks.  She has a history of significant heartburn but takes pantoprazole 40 mg in the morning.  On the medication, she does not have heartburn.  However, she does report epigastric pain.  She denies nausea, vomiting, dysphagia.  She does have constipation.  She can go every other day but sometimes goes up to a week without a bowel movement.  She denies melena or bright red blood per rectum.  The patient's weight is stable.  She had rotator cuff surgery in June 2023.  The patient has not been admitted to the hospital in the past year and denies any cardiac issues.

## 2024-05-22 NOTE — CONSULT LETTER
[FreeTextEntry1] : Dear Dr. Connie Perry,   I had the pleasure of seeing your patient MOSES SHIN in the office today.  My office note is attached. PLEASE READ THE "ASSESSMENT" SECTION OF THE NOTE TO SEE MY IMPRESSION AND PLAN.   Thank you very much for allowing me to participate in the care of your patient.   Sincerely,   Horace Aiken M.D., FACG, FACP Director, Celiac Program at [facility]  of Medicine, [facility] [employer] at [facility] Adjunct  of Medicine, [employer] of [facility] Practice Director, [facility] - Gastroenterology at [address] [place] [address] Tel: [phone] Email: [email or web address]     The attached note has been created using a voice recognition system ([email or web address]).  There may be some misspellings and typos.  Please call my office if you have any issues or questions.

## 2024-05-22 NOTE — PHYSICAL EXAM
[None] : no edema [No CVA Tenderness] : no CVA  tenderness [Normal] : oriented to person, place, and time [de-identified] : mild diffuse tenderness

## 2024-06-03 ENCOUNTER — APPOINTMENT (OUTPATIENT)
Dept: CT IMAGING | Facility: CLINIC | Age: 64
End: 2024-06-03
Payer: COMMERCIAL

## 2024-06-03 ENCOUNTER — OUTPATIENT (OUTPATIENT)
Dept: OUTPATIENT SERVICES | Facility: HOSPITAL | Age: 64
LOS: 1 days | End: 2024-06-03
Payer: MEDICAID

## 2024-06-03 ENCOUNTER — RESULT REVIEW (OUTPATIENT)
Age: 64
End: 2024-06-03

## 2024-06-03 DIAGNOSIS — K21.9 GASTRO-ESOPHAGEAL REFLUX DISEASE WITHOUT ESOPHAGITIS: ICD-10-CM

## 2024-06-03 DIAGNOSIS — Z98.890 OTHER SPECIFIED POSTPROCEDURAL STATES: Chronic | ICD-10-CM

## 2024-06-03 DIAGNOSIS — R10.9 UNSPECIFIED ABDOMINAL PAIN: ICD-10-CM

## 2024-06-03 DIAGNOSIS — Z98.891 HISTORY OF UTERINE SCAR FROM PREVIOUS SURGERY: Chronic | ICD-10-CM

## 2024-06-03 DIAGNOSIS — K21.9 GASTRO-ESOPHAGEAL REFLUX DISEASE WITHOUT ESOPHAGITIS: Chronic | ICD-10-CM

## 2024-06-03 DIAGNOSIS — K59.00 CONSTIPATION, UNSPECIFIED: ICD-10-CM

## 2024-06-03 DIAGNOSIS — Z86.010 PERSONAL HISTORY OF COLONIC POLYPS: ICD-10-CM

## 2024-06-03 DIAGNOSIS — R10.13 EPIGASTRIC PAIN: ICD-10-CM

## 2024-06-03 PROCEDURE — 74177 CT ABD & PELVIS W/CONTRAST: CPT | Mod: 26

## 2024-06-03 PROCEDURE — 74177 CT ABD & PELVIS W/CONTRAST: CPT

## 2024-06-11 ENCOUNTER — NON-APPOINTMENT (OUTPATIENT)
Age: 64
End: 2024-06-11

## 2024-06-14 ENCOUNTER — NON-APPOINTMENT (OUTPATIENT)
Age: 64
End: 2024-06-14

## 2024-06-21 ENCOUNTER — APPOINTMENT (OUTPATIENT)
Dept: INTERNAL MEDICINE | Facility: CLINIC | Age: 64
End: 2024-06-21
Payer: COMMERCIAL

## 2024-06-21 VITALS
WEIGHT: 223 LBS | HEART RATE: 91 BPM | DIASTOLIC BLOOD PRESSURE: 80 MMHG | BODY MASS INDEX: 34.59 KG/M2 | TEMPERATURE: 98.2 F | HEIGHT: 67.5 IN | OXYGEN SATURATION: 97 % | RESPIRATION RATE: 16 BRPM | SYSTOLIC BLOOD PRESSURE: 122 MMHG

## 2024-06-21 DIAGNOSIS — E27.8 OTHER SPECIFIED DISORDERS OF ADRENAL GLAND: ICD-10-CM

## 2024-06-21 PROCEDURE — G2211 COMPLEX E/M VISIT ADD ON: CPT

## 2024-06-21 PROCEDURE — 99213 OFFICE O/P EST LOW 20 MIN: CPT

## 2024-06-21 NOTE — HISTORY OF PRESENT ILLNESS
[de-identified] : 63 year old female who presents today for followup.  She saw her gastroenterologist Dr. Aiken. She had a CT abdomen that showed an incidental finding of an indeterminate left adrenal nodule. She was advised to follow up. She feels well and has no acute complaints.

## 2024-06-25 ENCOUNTER — APPOINTMENT (OUTPATIENT)
Dept: INTERNAL MEDICINE | Facility: CLINIC | Age: 64
End: 2024-06-25

## 2024-06-29 ENCOUNTER — APPOINTMENT (OUTPATIENT)
Dept: MRI IMAGING | Facility: CLINIC | Age: 64
End: 2024-06-29

## 2024-06-29 PROCEDURE — 74183 MRI ABD W/O CNTR FLWD CNTR: CPT | Mod: 26

## 2024-07-09 ENCOUNTER — APPOINTMENT (OUTPATIENT)
Dept: INTERNAL MEDICINE | Facility: CLINIC | Age: 64
End: 2024-07-09
Payer: COMMERCIAL

## 2024-07-09 VITALS
HEART RATE: 85 BPM | DIASTOLIC BLOOD PRESSURE: 82 MMHG | TEMPERATURE: 98 F | WEIGHT: 223 LBS | BODY MASS INDEX: 34.59 KG/M2 | OXYGEN SATURATION: 98 % | RESPIRATION RATE: 14 BRPM | SYSTOLIC BLOOD PRESSURE: 124 MMHG | HEIGHT: 67.5 IN

## 2024-07-09 DIAGNOSIS — E27.8 OTHER SPECIFIED DISORDERS OF ADRENAL GLAND: ICD-10-CM

## 2024-07-09 DIAGNOSIS — R05.3 CHRONIC COUGH: ICD-10-CM

## 2024-07-09 PROCEDURE — G2211 COMPLEX E/M VISIT ADD ON: CPT

## 2024-07-09 PROCEDURE — 99213 OFFICE O/P EST LOW 20 MIN: CPT

## 2024-07-09 RX ORDER — METHYLPREDNISOLONE 4 MG/1
4 TABLET ORAL
Qty: 1 | Refills: 0 | Status: ACTIVE | COMMUNITY
Start: 2024-07-09 | End: 1900-01-01

## 2024-07-09 RX ORDER — FLUTICASONE PROPIONATE 50 UG/1
50 SPRAY, METERED NASAL DAILY
Qty: 1 | Refills: 2 | Status: ACTIVE | COMMUNITY
Start: 2024-07-09 | End: 1900-01-01

## 2024-07-10 ENCOUNTER — RX RENEWAL (OUTPATIENT)
Age: 64
End: 2024-07-10

## 2024-07-23 ENCOUNTER — APPOINTMENT (OUTPATIENT)
Dept: INTERNAL MEDICINE | Facility: CLINIC | Age: 64
End: 2024-07-23

## 2024-07-31 ENCOUNTER — NON-APPOINTMENT (OUTPATIENT)
Age: 64
End: 2024-07-31

## 2024-08-01 ENCOUNTER — APPOINTMENT (OUTPATIENT)
Dept: INTERNAL MEDICINE | Facility: CLINIC | Age: 64
End: 2024-08-01
Payer: COMMERCIAL

## 2024-08-01 VITALS
RESPIRATION RATE: 14 BRPM | TEMPERATURE: 98.3 F | HEIGHT: 67.5 IN | DIASTOLIC BLOOD PRESSURE: 80 MMHG | SYSTOLIC BLOOD PRESSURE: 120 MMHG | HEART RATE: 89 BPM | WEIGHT: 221 LBS | BODY MASS INDEX: 34.28 KG/M2 | OXYGEN SATURATION: 97 %

## 2024-08-01 DIAGNOSIS — M54.50 LOW BACK PAIN, UNSPECIFIED: ICD-10-CM

## 2024-08-01 PROCEDURE — 99204 OFFICE O/P NEW MOD 45 MIN: CPT

## 2024-08-01 RX ORDER — CHOLECALCIFEROL (VITAMIN D3) 25 MCG
25 MCG TABLET ORAL DAILY
Qty: 90 | Refills: 0 | Status: ACTIVE | COMMUNITY
Start: 2024-08-01 | End: 1900-01-01

## 2024-08-01 NOTE — PLAN
[FreeTextEntry1] : Long standing low back pain likely sciatica reviewed results of recent CT abdo and MRI, where she is noted to have no spine fractures, has some degenerative changes as noted on CT. Advised Adviil BID, with food Referral given for PT. Advised to return in 1 week should symptoms not improve or worsen. Needs ED should she have leg weakness, incontinence.

## 2024-08-01 NOTE — PHYSICAL EXAM
[No Acute Distress] : no acute distress [Well Nourished] : well nourished [Well Developed] : well developed [Well-Appearing] : well-appearing [Normal Sclera/Conjunctiva] : normal sclera/conjunctiva [PERRL] : pupils equal round and reactive to light [EOMI] : extraocular movements intact [Normal Outer Ear/Nose] : the outer ears and nose were normal in appearance [Normal Oropharynx] : the oropharynx was normal [No JVD] : no jugular venous distention [No Lymphadenopathy] : no lymphadenopathy [Supple] : supple [No Respiratory Distress] : no respiratory distress  [No Accessory Muscle Use] : no accessory muscle use [Clear to Auscultation] : lungs were clear to auscultation bilaterally [Normal Rate] : normal rate  [Regular Rhythm] : with a regular rhythm [Normal S1, S2] : normal S1 and S2 [Pedal Pulses Present] : the pedal pulses are present [No Edema] : there was no peripheral edema [Soft] : abdomen soft [Non Tender] : non-tender [Non-distended] : non-distended [No Masses] : no abdominal mass palpated [Normal Posterior Cervical Nodes] : no posterior cervical lymphadenopathy [Normal Anterior Cervical Nodes] : no anterior cervical lymphadenopathy [No CVA Tenderness] : no CVA  tenderness [No Joint Swelling] : no joint swelling [Grossly Normal Strength/Tone] : grossly normal strength/tone [No Rash] : no rash [No Focal Deficits] : no focal deficits [Normal Gait] : normal gait [Normal Affect] : the affect was normal [Normal Insight/Judgement] : insight and judgment were intact [de-identified] : tenderness around sacral and around L5 [de-identified] : straight leg raising test positive right LE

## 2024-08-01 NOTE — REVIEW OF SYSTEMS
[Incontinence] : no incontinence [Joint Swelling] : no joint swelling [Muscle Weakness] : no muscle weakness [Back Pain] : back pain [Negative] : Psychiatric

## 2024-08-01 NOTE — HISTORY OF PRESENT ILLNESS
[FreeTextEntry8] : 63 yr old female here for evaluation of low back pain. States she has been having on and off back pain for a long time, for which PT worked. For the last 6 days, pain is more frequent, lower back and radiating to the right leg. She denies trauma, or lifting anything heavy or falls. She denies urinary or bowel incontinence or any weakness of her lower extremities. She took Motrin with moderate relief.

## 2024-10-07 ENCOUNTER — APPOINTMENT (OUTPATIENT)
Dept: ENDOCRINOLOGY | Facility: CLINIC | Age: 64
End: 2024-10-07

## 2024-11-08 ENCOUNTER — APPOINTMENT (OUTPATIENT)
Dept: ENDOCRINOLOGY | Facility: CLINIC | Age: 64
End: 2024-11-08
Payer: COMMERCIAL

## 2024-11-08 VITALS
WEIGHT: 216 LBS | BODY MASS INDEX: 33.51 KG/M2 | HEIGHT: 67.5 IN | HEART RATE: 72 BPM | SYSTOLIC BLOOD PRESSURE: 147 MMHG | OXYGEN SATURATION: 98 % | DIASTOLIC BLOOD PRESSURE: 91 MMHG

## 2024-11-08 DIAGNOSIS — E27.9 DISORDER OF ADRENAL GLAND, UNSPECIFIED: ICD-10-CM

## 2024-11-08 PROCEDURE — 99204 OFFICE O/P NEW MOD 45 MIN: CPT

## 2024-11-16 LAB — DHEA-S SERPL-MCNC: 54 UG/DL

## 2024-12-04 ENCOUNTER — NON-APPOINTMENT (OUTPATIENT)
Age: 64
End: 2024-12-04

## 2024-12-04 ENCOUNTER — APPOINTMENT (OUTPATIENT)
Dept: CARDIOLOGY | Facility: CLINIC | Age: 64
End: 2024-12-04
Payer: COMMERCIAL

## 2024-12-04 VITALS
DIASTOLIC BLOOD PRESSURE: 82 MMHG | BODY MASS INDEX: 33.35 KG/M2 | WEIGHT: 215 LBS | OXYGEN SATURATION: 96 % | HEIGHT: 67.5 IN | HEART RATE: 81 BPM | SYSTOLIC BLOOD PRESSURE: 116 MMHG

## 2024-12-04 DIAGNOSIS — E66.9 OBESITY, UNSPECIFIED: ICD-10-CM

## 2024-12-04 DIAGNOSIS — R06.09 OTHER FORMS OF DYSPNEA: ICD-10-CM

## 2024-12-04 DIAGNOSIS — I77.810 THORACIC AORTIC ECTASIA: ICD-10-CM

## 2024-12-04 PROCEDURE — 93000 ELECTROCARDIOGRAM COMPLETE: CPT

## 2024-12-04 PROCEDURE — 99214 OFFICE O/P EST MOD 30 MIN: CPT | Mod: 25

## 2024-12-18 ENCOUNTER — APPOINTMENT (OUTPATIENT)
Dept: INTERNAL MEDICINE | Facility: CLINIC | Age: 64
End: 2024-12-18
Payer: MEDICAID

## 2024-12-18 VITALS
HEART RATE: 71 BPM | WEIGHT: 220 LBS | RESPIRATION RATE: 14 BRPM | BODY MASS INDEX: 34.13 KG/M2 | HEIGHT: 67.5 IN | TEMPERATURE: 97.9 F | DIASTOLIC BLOOD PRESSURE: 82 MMHG | SYSTOLIC BLOOD PRESSURE: 118 MMHG | OXYGEN SATURATION: 98 %

## 2024-12-18 DIAGNOSIS — M25.519 PAIN IN UNSPECIFIED SHOULDER: ICD-10-CM

## 2024-12-18 DIAGNOSIS — M75.01 ADHESIVE CAPSULITIS OF RIGHT SHOULDER: ICD-10-CM

## 2024-12-18 PROCEDURE — 99213 OFFICE O/P EST LOW 20 MIN: CPT

## 2024-12-23 ENCOUNTER — APPOINTMENT (OUTPATIENT)
Dept: ORTHOPEDIC SURGERY | Facility: CLINIC | Age: 64
End: 2024-12-23
Payer: MEDICAID

## 2024-12-23 DIAGNOSIS — M19.111 POST-TRAUMATIC OSTEOARTHRITIS, RIGHT SHOULDER: ICD-10-CM

## 2024-12-23 DIAGNOSIS — Z98.890 OTHER SPECIFIED POSTPROCEDURAL STATES: ICD-10-CM

## 2024-12-23 PROCEDURE — 99215 OFFICE O/P EST HI 40 MIN: CPT | Mod: 25

## 2024-12-23 PROCEDURE — 73030 X-RAY EXAM OF SHOULDER: CPT | Mod: RT

## 2025-01-14 ENCOUNTER — RESULT REVIEW (OUTPATIENT)
Age: 65
End: 2025-01-14

## 2025-01-14 ENCOUNTER — APPOINTMENT (OUTPATIENT)
Dept: MAMMOGRAPHY | Facility: CLINIC | Age: 65
End: 2025-01-14

## 2025-01-14 ENCOUNTER — OUTPATIENT (OUTPATIENT)
Dept: OUTPATIENT SERVICES | Facility: HOSPITAL | Age: 65
LOS: 1 days | End: 2025-01-14
Payer: MEDICAID

## 2025-01-14 DIAGNOSIS — Z98.891 HISTORY OF UTERINE SCAR FROM PREVIOUS SURGERY: Chronic | ICD-10-CM

## 2025-01-14 DIAGNOSIS — Z00.00 ENCOUNTER FOR GENERAL ADULT MEDICAL EXAMINATION WITHOUT ABNORMAL FINDINGS: ICD-10-CM

## 2025-01-14 DIAGNOSIS — K21.9 GASTRO-ESOPHAGEAL REFLUX DISEASE WITHOUT ESOPHAGITIS: Chronic | ICD-10-CM

## 2025-01-14 DIAGNOSIS — Z98.890 OTHER SPECIFIED POSTPROCEDURAL STATES: Chronic | ICD-10-CM

## 2025-01-14 PROCEDURE — 77067 SCR MAMMO BI INCL CAD: CPT

## 2025-01-14 PROCEDURE — 77067 SCR MAMMO BI INCL CAD: CPT | Mod: 26

## 2025-01-14 PROCEDURE — 77063 BREAST TOMOSYNTHESIS BI: CPT

## 2025-01-14 PROCEDURE — 77063 BREAST TOMOSYNTHESIS BI: CPT | Mod: 26

## 2025-01-16 ENCOUNTER — APPOINTMENT (OUTPATIENT)
Dept: CARDIOLOGY | Facility: CLINIC | Age: 65
End: 2025-01-16

## 2025-01-16 ENCOUNTER — NON-APPOINTMENT (OUTPATIENT)
Age: 65
End: 2025-01-16

## 2025-01-16 VITALS
HEIGHT: 67.5 IN | WEIGHT: 217 LBS | HEART RATE: 83 BPM | OXYGEN SATURATION: 98 % | DIASTOLIC BLOOD PRESSURE: 82 MMHG | BODY MASS INDEX: 33.66 KG/M2 | SYSTOLIC BLOOD PRESSURE: 144 MMHG

## 2025-01-16 DIAGNOSIS — R07.9 CHEST PAIN, UNSPECIFIED: ICD-10-CM

## 2025-01-16 DIAGNOSIS — R06.09 OTHER FORMS OF DYSPNEA: ICD-10-CM

## 2025-01-16 DIAGNOSIS — R07.89 OTHER CHEST PAIN: ICD-10-CM

## 2025-01-16 PROCEDURE — 93015 CV STRESS TEST SUPVJ I&R: CPT

## 2025-01-16 PROCEDURE — 99214 OFFICE O/P EST MOD 30 MIN: CPT | Mod: 25

## 2025-01-20 ENCOUNTER — RESULT REVIEW (OUTPATIENT)
Age: 65
End: 2025-01-20

## 2025-01-20 ENCOUNTER — APPOINTMENT (OUTPATIENT)
Dept: ULTRASOUND IMAGING | Facility: CLINIC | Age: 65
End: 2025-01-20
Payer: MEDICAID

## 2025-01-20 PROCEDURE — 76641 ULTRASOUND BREAST COMPLETE: CPT | Mod: 26,50

## 2025-01-21 ENCOUNTER — APPOINTMENT (OUTPATIENT)
Dept: OBGYN | Facility: CLINIC | Age: 65
End: 2025-01-21
Payer: MEDICAID

## 2025-01-21 VITALS
DIASTOLIC BLOOD PRESSURE: 84 MMHG | BODY MASS INDEX: 34.13 KG/M2 | HEIGHT: 67.5 IN | SYSTOLIC BLOOD PRESSURE: 142 MMHG | WEIGHT: 220 LBS

## 2025-01-21 DIAGNOSIS — Z12.39 ENCOUNTER FOR OTHER SCREENING FOR MALIGNANT NEOPLASM OF BREAST: ICD-10-CM

## 2025-01-21 DIAGNOSIS — Z01.419 ENCOUNTER FOR GYNECOLOGICAL EXAMINATION (GENERAL) (ROUTINE) W/OUT ABNORMAL FINDINGS: ICD-10-CM

## 2025-01-21 DIAGNOSIS — Z12.4 ENCOUNTER FOR SCREENING FOR MALIGNANT NEOPLASM OF CERVIX: ICD-10-CM

## 2025-01-21 PROCEDURE — 99396 PREV VISIT EST AGE 40-64: CPT

## 2025-01-21 PROCEDURE — 99459 PELVIC EXAMINATION: CPT

## 2025-01-22 DIAGNOSIS — R92.8 OTHER ABNORMAL AND INCONCLUSIVE FINDINGS ON DIAGNOSTIC IMAGING OF BREAST: ICD-10-CM

## 2025-01-22 LAB — HPV HIGH+LOW RISK DNA PNL CVX: NOT DETECTED

## 2025-01-26 LAB — CYTOLOGY CVX/VAG DOC THIN PREP: NORMAL

## 2025-01-30 ENCOUNTER — APPOINTMENT (OUTPATIENT)
Dept: INTERNAL MEDICINE | Facility: CLINIC | Age: 65
End: 2025-01-30
Payer: MEDICAID

## 2025-01-30 VITALS
RESPIRATION RATE: 14 BRPM | TEMPERATURE: 98 F | HEIGHT: 67.5 IN | WEIGHT: 220 LBS | HEART RATE: 70 BPM | BODY MASS INDEX: 34.13 KG/M2 | SYSTOLIC BLOOD PRESSURE: 120 MMHG | OXYGEN SATURATION: 98 % | DIASTOLIC BLOOD PRESSURE: 74 MMHG

## 2025-01-30 DIAGNOSIS — M54.12 RADICULOPATHY, CERVICAL REGION: ICD-10-CM

## 2025-01-30 PROCEDURE — 99213 OFFICE O/P EST LOW 20 MIN: CPT

## 2025-01-30 RX ORDER — MELOXICAM 15 MG/1
15 TABLET ORAL
Qty: 15 | Refills: 0 | Status: ACTIVE | COMMUNITY
Start: 2025-01-30 | End: 1900-01-01

## 2025-01-30 RX ORDER — METHOCARBAMOL 750 MG/1
750 TABLET, FILM COATED ORAL 3 TIMES DAILY
Qty: 60 | Refills: 0 | Status: ACTIVE | COMMUNITY
Start: 2025-01-30 | End: 1900-01-01

## 2025-03-15 NOTE — ASU PREOP CHECKLIST - NS PREOP CHK MONITOR ANESTHESIA CONSENT
Will monitor, BP at goal at home   Airway patent, TM normal bilaterally, normal appearing mouth, nose, throat, neck supple with full range of motion, no cervical adenopathy. done

## (undated) DEVICE — DRAPE SPLIT SHEET 77" X 120"

## (undated) DEVICE — NDL SPINAL 18G X 3.5" (PINK)

## (undated) DEVICE — TUBING SUCTION NONCONDUCTIVE 6MM X 12FT

## (undated) DEVICE — VENODYNE/SCD SLEEVE CALF MEDIUM

## (undated) DEVICE — POSITIONER S&N SPIDER STABILIZATION KIT SHOULDER

## (undated) DEVICE — GLV 8 PROTEXIS (WHITE)

## (undated) DEVICE — CANNULA ARTHREX TWIST IN NO SQUIRT CAP 7X7 PURPLE

## (undated) DEVICE — SUT MONOCRYL 3-0 18" PS-2 UNDYED

## (undated) DEVICE — PREP CHLORAPREP HI-LITE ORANGE 26ML

## (undated) DEVICE — SUT MONOCRYL 4-0 27" PS-2 UNDYED

## (undated) DEVICE — ARTHREX MULTIFIRE SCORPION NEEDLE

## (undated) DEVICE — S&N ARTHROCARE WAND TURBOVAC 90 DEGREE

## (undated) DEVICE — DRSG MASTISOL

## (undated) DEVICE — SHAVER BLADE S&N INCISOR PLUS PLATINUM 4.5MM

## (undated) DEVICE — PACK KNEE ARTHROSCOPY

## (undated) DEVICE — CANNULA ARTHREX CRYSTAL 5.75MMX7MM ORANGE

## (undated) DEVICE — SUT PDS II 0 27" CT-2

## (undated) DEVICE — WARMING BLANKET LOWER ADULT

## (undated) DEVICE — GLV 7.5 PROTEXIS (WHITE)

## (undated) DEVICE — TUBING LINVATEC ARTHROSCOPY IN/OUTFLOW

## (undated) DEVICE — DRAPE STICKY U BLUE 60 X 84"

## (undated) DEVICE — SOL IRR LR 3000ML

## (undated) DEVICE — DRSG STERISTRIPS 0.5 X 4"